# Patient Record
Sex: FEMALE | Race: WHITE
[De-identification: names, ages, dates, MRNs, and addresses within clinical notes are randomized per-mention and may not be internally consistent; named-entity substitution may affect disease eponyms.]

---

## 2017-05-11 ENCOUNTER — HOSPITAL ENCOUNTER (EMERGENCY)
Dept: HOSPITAL 47 - EC | Age: 82
Discharge: HOME | End: 2017-05-11
Payer: MEDICARE

## 2017-05-11 VITALS
DIASTOLIC BLOOD PRESSURE: 69 MMHG | HEART RATE: 82 BPM | RESPIRATION RATE: 14 BRPM | SYSTOLIC BLOOD PRESSURE: 159 MMHG | TEMPERATURE: 99.2 F

## 2017-05-11 DIAGNOSIS — Z90.710: ICD-10-CM

## 2017-05-11 DIAGNOSIS — F17.200: ICD-10-CM

## 2017-05-11 DIAGNOSIS — M81.0: ICD-10-CM

## 2017-05-11 DIAGNOSIS — R50.9: ICD-10-CM

## 2017-05-11 DIAGNOSIS — Z79.899: ICD-10-CM

## 2017-05-11 DIAGNOSIS — Z85.42: ICD-10-CM

## 2017-05-11 DIAGNOSIS — I10: ICD-10-CM

## 2017-05-11 DIAGNOSIS — Z88.6: ICD-10-CM

## 2017-05-11 DIAGNOSIS — N30.90: Primary | ICD-10-CM

## 2017-05-11 LAB
ALP SERPL-CCNC: 96 U/L (ref 38–126)
ALT SERPL-CCNC: 34 U/L (ref 9–52)
ANION GAP SERPL CALC-SCNC: 9 MMOL/L
AST SERPL-CCNC: 30 U/L (ref 14–36)
BASOPHILS # BLD AUTO: 0 K/UL (ref 0–0.2)
BASOPHILS NFR BLD AUTO: 0 %
BUN SERPL-SCNC: 23 MG/DL (ref 7–17)
CALCIUM SPEC-MCNC: 10 MG/DL (ref 8.4–10.2)
CH: 30.2
CHCM: 32.8
CHLORIDE SERPL-SCNC: 102 MMOL/L (ref 98–107)
CO2 SERPL-SCNC: 27 MMOL/L (ref 22–30)
EOSINOPHIL # BLD AUTO: 0.3 K/UL (ref 0–0.7)
EOSINOPHIL NFR BLD AUTO: 2 %
ERYTHROCYTE [DISTWIDTH] IN BLOOD BY AUTOMATED COUNT: 4.09 M/UL (ref 3.8–5.4)
ERYTHROCYTE [DISTWIDTH] IN BLOOD: 13.1 % (ref 11.5–15.5)
GLUCOSE SERPL-MCNC: 126 MG/DL (ref 74–99)
HCT VFR BLD AUTO: 37.8 % (ref 34–46)
HDW: 2.39
HGB BLD-MCNC: 12.1 GM/DL (ref 11.4–16)
LUC NFR BLD AUTO: 2 %
LYMPHOCYTES # SPEC AUTO: 0.7 K/UL (ref 1–4.8)
LYMPHOCYTES NFR SPEC AUTO: 5 %
MCH RBC QN AUTO: 29.7 PG (ref 25–35)
MCHC RBC AUTO-ENTMCNC: 32.1 G/DL (ref 31–37)
MCV RBC AUTO: 92.4 FL (ref 80–100)
MONOCYTES # BLD AUTO: 0.8 K/UL (ref 0–1)
MONOCYTES NFR BLD AUTO: 6 %
NEUTROPHILS # BLD AUTO: 11.9 K/UL (ref 1.3–7.7)
NEUTROPHILS NFR BLD AUTO: 85 %
NON-AFRICAN AMERICAN GFR(MDRD): >60
PARTICLE COUNT: 6881
PH UR: 6 [PH] (ref 5–8)
POTASSIUM SERPL-SCNC: 3.7 MMOL/L (ref 3.5–5.1)
PROT SERPL-MCNC: 6.8 G/DL (ref 6.3–8.2)
PROT UR QL: (no result)
RBC UR QL: >182 /HPF (ref 0–5)
SODIUM SERPL-SCNC: 138 MMOL/L (ref 137–145)
SP GR UR: 1.01 (ref 1–1.03)
SQUAMOUS UR QL AUTO: 1 /HPF (ref 0–4)
UA BILLING (MACRO VS. MICRO): (no result)
UROBILINOGEN UR QL STRIP: <2 MG/DL (ref ?–2)
WBC # BLD AUTO: 0.21 10*3/UL
WBC # BLD AUTO: 14 K/UL (ref 3.8–10.6)
WBC #/AREA URNS HPF: 30 /HPF (ref 0–5)
WBC (PEROX): 14.9

## 2017-05-11 PROCEDURE — 36415 COLL VENOUS BLD VENIPUNCTURE: CPT

## 2017-05-11 PROCEDURE — 85025 COMPLETE CBC W/AUTO DIFF WBC: CPT

## 2017-05-11 PROCEDURE — 83605 ASSAY OF LACTIC ACID: CPT

## 2017-05-11 PROCEDURE — 99283 EMERGENCY DEPT VISIT LOW MDM: CPT

## 2017-05-11 PROCEDURE — 87040 BLOOD CULTURE FOR BACTERIA: CPT

## 2017-05-11 PROCEDURE — 80053 COMPREHEN METABOLIC PANEL: CPT

## 2017-05-11 PROCEDURE — 87086 URINE CULTURE/COLONY COUNT: CPT

## 2017-05-11 PROCEDURE — 81001 URINALYSIS AUTO W/SCOPE: CPT

## 2017-05-11 PROCEDURE — 96365 THER/PROPH/DIAG IV INF INIT: CPT

## 2017-05-11 PROCEDURE — 96361 HYDRATE IV INFUSION ADD-ON: CPT

## 2017-05-11 NOTE — ED
Fever HPI





- General


Chief Complaint: Fever


Stated Complaint: Anxiety


Time Seen by Provider: 05/11/17 21:26


Source: patient


Mode of arrival: wheelchair


Limitations: no limitations





- History of Present Illness


Initial Comments: 





He has a difficulty urinating for the last few days it's painful is more 

frequent and she feels bit of pressure in the pelvic area, she went to the 

family doctor they aren't treating her for cystitis with the Macrobid but 

symptoms are getting worse and today she developed the fever.  Notice that mom 

was shaking and had some breakers she is complaining about some back pain but 

then she has a chronic back pain for years.  Denies any headaches no neck 

stiffness no chest pain or shortness of breath no abdominal pain diarrhea 

system is unremarkable otherwise





- Related Data


 Home Medications











 Medication  Instructions  Recorded  Confirmed


 


Calcium Carbonate [Calcium] 600 mg PO DAILY 05/11/17 05/11/17


 


Cholecalciferol [Vitamin D3] 1,000 unit PO DAILY 05/11/17 05/11/17


 


Lisinopril [Zestril] 10 mg PO DAILY 05/11/17 05/11/17


 


Loratadine [Claritin] 10 mg PO DAILY 05/11/17 05/11/17


 


traMADol HCL [Ultram] 50 mg PO QID PRN 05/11/17 05/11/17








 Previous Rx's











 Medication  Instructions  Recorded


 


Ciprofloxacin HCl [Cipro] 500 mg PO Q12HR #20 tablet 05/11/17











 Allergies











Allergy/AdvReac Type Severity Reaction Status Date / Time


 


aspirin AdvReac  Nausea & Verified 05/11/17 22:02





   Vomiting  














Review of Systems


ROS Statement: 


Those systems with pertinent positive or pertinent negative responses have been 

documented in the HPI.





ROS Other: All systems not noted in ROS Statement are negative.





Past Medical History


Past Medical History: Cancer, Hypertension


Additional Past Medical History / Comment(s): Scoliosis; Osteoporosis; Uterine 

cancer (1970s)


History of Any Multi-Drug Resistant Organisms: None Reported


Past Surgical History: Cholecystectomy, Hysterectomy


Additional Past Surgical History / Comment(s): Lap Nissen


Past Psychological History: Anxiety


Smoking Status: Current every day smoker


Past Alcohol Use History: None Reported


Past Drug Use History: None Reported





General Exam





- General Exam Comments


Initial Comments: 





General:  The patient is awake and alert, in acute distress face is bit flushed 

GCS is 15 no respiratory distress 


Skin:  Skin is warm and dry and no rashes or lesions are noted. 


Eye:  Pupils are equal, round and reactive to light, extra-ocular movements are 

intact; there is normal conjunctiva bilaterally.  


Ears, nose, mouth and throat:  There are moist mucous membranes and no oral 

lesions. 


Neck:  The neck is supple, there is no tenderness  or JVD.  


Cardiovascular:  There is a regular rate and rhythm. No murmur, rub or gallop 

is appreciated.


Respiratory: To auscultation bilateral, decreased breath sounds and crackles at 

the bases


Gastrointestinal:  D tender in suprapubic area and over the one side flank but 

she stated that she has it's back pain for years 


Back:  There is no tenderness to palpation in the midline. There is no obvious 

deformity.


Musculoskeletal:  Normal ROM, no tenderness, There is no pedal edema. There is 

no calf tenderness or swelling. No cords were appreciated.  


Neurological:  CN II-XII intact, Cranial nerves III through XII are intact. 

There are no obvious motor or sensory deficits. Coordination appears grossly 

intact. Speech is normal.


Psychiatric:  Cooperative, appropriate mood & affect, normal judgment.  





Limitations: no limitations





Course


 Vital Signs











  05/11/17





  20:53


 


Temperature 101.2 F H


 


Pulse Rate 95


 


Respiratory 20





Rate 


 


Blood Pressure 215/85


 


O2 Sat by Pulse 95





Oximetry 








He was reassessed at 2315 she was advised to stay inpatient for IV antibiotics 

considering the possibility of pyelonephritis since her urinalysis was positive 

she developed a fever and she has a flank pain also was concerned about her 

blood pressure initial blood pressure was 2:15 systolic and now she feels 

better her blood pressures down to 159 according to the portable blood pressure 

machine bedside and she has a 2 g of Rocephin and some fluids and she has 

excellent support she lives with her daughter in [place] and they said 

the CARE of her if symptoms get worse to bring her back actually I had paged 

Dr. Kimble for the admission but then respecting patient's wishes be sent home 

on Cipro 500 mg twice daily Tylenol as needed and she does have tramadol in her 

possession which she uses for the back pain





Medical Decision Making





- Lab Data


Result diagrams: 


 05/11/17 22:11





 05/11/17 22:11


 Lab Results











  05/11/17 05/11/17 05/11/17 Range/Units





  22:11 22:11 22:11 


 


WBC  14.0 H    (3.8-10.6)  k/uL


 


RBC  4.09    (3.80-5.40)  m/uL


 


Hgb  12.1    (11.4-16.0)  gm/dL


 


Hct  37.8    (34.0-46.0)  %


 


MCV  92.4    (80.0-100.0)  fL


 


MCH  29.7    (25.0-35.0)  pg


 


MCHC  32.1    (31.0-37.0)  g/dL


 


RDW  13.1    (11.5-15.5)  %


 


Plt Count  262    (150-450)  k/uL


 


Neutrophils %  85    %


 


Lymphocytes %  5    %


 


Monocytes %  6    %


 


Eosinophils %  2    %


 


Basophils %  0    %


 


Neutrophils #  11.9 H    (1.3-7.7)  k/uL


 


Lymphocytes #  0.7 L    (1.0-4.8)  k/uL


 


Monocytes #  0.8    (0-1.0)  k/uL


 


Eosinophils #  0.3    (0-0.7)  k/uL


 


Basophils #  0.0    (0-0.2)  k/uL


 


Sodium   138   (137-145)  mmol/L


 


Potassium   3.7   (3.5-5.1)  mmol/L


 


Chloride   102   ()  mmol/L


 


Carbon Dioxide   27   (22-30)  mmol/L


 


Anion Gap   9   mmol/L


 


BUN   23 H   (7-17)  mg/dL


 


Creatinine   0.73   (0.52-1.04)  mg/dL


 


Est GFR (MDRD) Af Amer   >60   (>60 ml/min/1.73 sqM)  


 


Est GFR (MDRD) Non-Af   >60   (>60 ml/min/1.73 sqM)  


 


Glucose   126 H   (74-99)  mg/dL


 


Plasma Lactic Acid Boris    1.0  (0.7-2.0)  mmol/L


 


Calcium   10.0   (8.4-10.2)  mg/dL


 


Total Bilirubin   0.4   (0.2-1.3)  mg/dL


 


AST   30   (14-36)  U/L


 


ALT   34   (9-52)  U/L


 


Alkaline Phosphatase   96   ()  U/L


 


Total Protein   6.8   (6.3-8.2)  g/dL


 


Albumin   3.9   (3.5-5.0)  g/dL














Disposition


Clinical Impression: 


 Fever, Cystitis





Disposition: HOME SELF-CARE


Condition: Fair


Instructions:  Fever in Adults (ED)


Prescriptions: 


Ciprofloxacin HCl [Cipro] 500 mg PO Q12HR #20 tablet


Referrals: 


Shazia Dennison DO [Primary Care Provider] - 1-2 days

## 2017-06-20 ENCOUNTER — HOSPITAL ENCOUNTER (OUTPATIENT)
Dept: HOSPITAL 47 - RADUSWWP | Age: 82
Discharge: HOME | End: 2017-06-20
Payer: MEDICARE

## 2017-06-20 DIAGNOSIS — R93.41: ICD-10-CM

## 2017-06-20 DIAGNOSIS — N13.30: Primary | ICD-10-CM

## 2017-06-20 PROCEDURE — 76770 US EXAM ABDO BACK WALL COMP: CPT

## 2017-06-20 NOTE — US
EXAMINATION TYPE: US kidneys/renal and bladder

 

DATE OF EXAM: 6/20/2017

 

COMPARISON: NONE

 

CLINICAL HISTORY: UTI N39.0, R39.14 Incomplete Bladder Emptying. Frequent UTI's, lower back and pelvi
c pain

 

EXAM MEASUREMENTS:

 

Right Kidney:  11.3 x 4.8 x 4.3 cm

Left Kidney: 9.6 x 4.0 x 3.9 cm

Post Void Residual Volume:  596.1 mL

 

Right Kidney: moderate hydronephrosis extending into calyces with multiple echogenic shadowing foci w
ithin dilated proximal ureter  

Left Kidney: mild hydronephrosis extending into calyces, superior pole limited by overlying bowel gas
  

Bladder: multiple echogenic shadowing foci seen posterior to bladder, possible within bladder vs. wit
hin ureter

**Bilateral Jets seen: right jet not seen

**Normal Post Void Residual: no

 

There is bilateral hydronephrosis, greater on the right left. The posterior wall of the bladder has u
nusual echogenicity. The left ureteral jet is visualized the right is not.

 

IMPRESSION:

 

BILATERAL HYDRONEPHROSIS AND AN ABNORMAL APPEARANCE OF THE POSTERIOR WALL OF THE BLADDER WITH ABSENCE
 OF ONE OF THE URETERAL JETS. A CT SCAN OF THE ABDOMEN AND PELVIS COULD BE SUGGESTED.

## 2019-01-25 ENCOUNTER — HOSPITAL ENCOUNTER (EMERGENCY)
Dept: HOSPITAL 47 - EC | Age: 84
LOS: 1 days | Discharge: HOME | End: 2019-01-26
Payer: MEDICARE

## 2019-01-25 DIAGNOSIS — Z88.6: ICD-10-CM

## 2019-01-25 DIAGNOSIS — N39.0: ICD-10-CM

## 2019-01-25 DIAGNOSIS — Z85.42: ICD-10-CM

## 2019-01-25 DIAGNOSIS — M48.56XA: Primary | ICD-10-CM

## 2019-01-25 DIAGNOSIS — Z79.899: ICD-10-CM

## 2019-01-25 DIAGNOSIS — F17.200: ICD-10-CM

## 2019-01-25 DIAGNOSIS — I10: ICD-10-CM

## 2019-01-25 DIAGNOSIS — N20.0: ICD-10-CM

## 2019-01-25 PROCEDURE — 81001 URINALYSIS AUTO W/SCOPE: CPT

## 2019-01-25 PROCEDURE — 87086 URINE CULTURE/COLONY COUNT: CPT

## 2019-01-25 PROCEDURE — 72192 CT PELVIS W/O DYE: CPT

## 2019-01-25 PROCEDURE — 99284 EMERGENCY DEPT VISIT MOD MDM: CPT

## 2019-01-25 PROCEDURE — 72131 CT LUMBAR SPINE W/O DYE: CPT

## 2019-01-25 PROCEDURE — 96372 THER/PROPH/DIAG INJ SC/IM: CPT

## 2019-01-25 PROCEDURE — 96374 THER/PROPH/DIAG INJ IV PUSH: CPT

## 2019-01-25 NOTE — CT
EXAMINATION TYPE: CT pelvis wo con

 

DATE OF EXAM: 1/25/2019

 

COMPARISON: None

 

HISTORY: Hx. of osteoporosis and scoliosis; pt. c/o back pain

 

CT DLP: 276.8 mGycm

Automated exposure control for dose reduction was used.

 

FINDINGS: 

Multiple axial sections were obtained from the top of iliac crests to the subtrochanteric femurs with
 no contrast.

THERE ARE NUMEROUS LARGE CALCIFICATIONS IN THE DEPENDENT DILATED RIGHT RENAL PELVIS. I SEE NO SIGNIFI
CANT RENAL ATROPHY. ABDOMINAL AORTA IS ATHEROMATOUS. THE SACRUM IS INTACT. THE SACROILIAC JOINTS ARE 
INTACT. THE PELVIC RING IS INTACT. THERE IS NARROWING OF HIP JOINT SPACES. THE PROXIMAL FEMURS ARE IN
TACT WITHOUT EVIDENCE OF A FRACTURE. THERE ARE MODERATE SPONDYLOTIC CHANGES IN THE LOWER LUMBAR SPINE
. THERE IS LARGE URINARY BLADDER WITH CALCIFICATIONS IN THE DEPENDENT BLADDER. URINARY BLADDER MEASUR
ES 12 CM. SACRUM AND COCCYX SHOW NO FRACTURE. THERE IS EXTENSIVE ATHEROSCLEROTIC VASCULAR CALCIFICATI
ON.

 

IMPRESSION:

Spondylotic changes in the lumbar spine. No compression fracture. No evidence of pelvic fracture. Mil
d osteoarthritis in the hip joints. Bladder calculi. Right renal calculi.

## 2019-01-25 NOTE — CT
EXAMINATION TYPE: CT lumbar spine wo con

 

DATE OF EXAM: 1/25/2019 11:48 PM

 

COMPARISON: None

 

HISTORY: Hx. of osteoporosis and scoliosis; pt. c/o back pain

 

CT DLP: 908.6 mGycm

Automated exposure control for dose reduction was used.

 

Unenhanced CT of the lumbar spine was performed.  Bone and soft tissue window settings are submitted 
as well as coronal and sagittal reconstructions. 

 

There is moderate degenerative disc space narrowing in the lumbar spine with spur formation. There is
 5% wedging of L4 vertebra. There is similar 15% wedging of L1 vertebra. L1 fracture could be acute.

 

The posterior elements appear intact. Sacroiliac joints are intact. There is extensive vascular calci
fication. There is no evidence of scoliosis.

 

IMPRESSION:

There is mild compression fracture of L1 that is probably an acute fracture.

 

Multilevel spondylotic changes.

## 2019-01-26 VITALS
SYSTOLIC BLOOD PRESSURE: 168 MMHG | RESPIRATION RATE: 16 BRPM | HEART RATE: 80 BPM | TEMPERATURE: 98.1 F | DIASTOLIC BLOOD PRESSURE: 78 MMHG

## 2019-01-26 LAB
PH UR: 6.5 [PH] (ref 5–8)
PROT UR QL: (no result)
RBC UR QL: 164 /HPF (ref 0–5)
SP GR UR: 1.01 (ref 1–1.03)
SQUAMOUS UR QL AUTO: <1 /HPF (ref 0–4)
UROBILINOGEN UR QL STRIP: <2 MG/DL (ref ?–2)
WBC #/AREA URNS HPF: 33 /HPF (ref 0–5)

## 2019-01-26 NOTE — ED
Back Pain HPI





- General


Chief Complaint: Back Pain/Injury


Stated Complaint: back pain


Source: patient, EMS


Limitations: no limitations





- History of Present Illness


Initial Comments: 


89-year-old female past medical history of osteoporosis and chronic back pain 

presented today for chief complaint of low back pain.  Patient states that for 

the past few year she has had chronic low back pain, she states it was recently 

agitated after getting on a table for an x-ray notably in the lower aspect of 

the spine.  She states that it is a constant aching pain that becomes sharp 

with movement.  Patient states she feels the pain radiate towards the hips 

bilaterally.  Patient states that since has been painful to walk.  Patient 

denies any numbness, tingling, weakness with this loss of sensation, urinary 

retention or loss of bowel bladder control.  Patient denies any falls within 

normal the last year.  Patient states her last fall was about a year ago.  

Patient denies any fever, chills.  Patient does have history of uterine cancer.

  Remainder of ROS negative, Patient denies any recent fever, chills, shortness 

of breath, chest pain, flank pain, abdominal pain, nausea or vomiting, numbness 

or tingling, dysuria or hematuria, constipation or diarrhea, headaches or 

visual changes, or any other complaints.  Patient states that she is feeling 

her typical lower back pain that has been ongoing for the past 2 months, 

however appear to be increased this evening and patient can no longer take the 

pain.  Patient states that her tramadol is not working for pain management.  

Upon arrival patient appears uncomfortable however in no acute distress.  

Patient appears nontoxic.








- Related Data


 Home Medications











 Medication  Instructions  Recorded  Confirmed


 


Calcium Carbonate [Calcium] 600 mg PO DAILY 05/11/17 01/25/19


 


Cholecalciferol [Vitamin D3] 1,000 unit PO DAILY 05/11/17 01/25/19


 


Lisinopril [Zestril] 10 mg PO DAILY 05/11/17 01/25/19


 


Loratadine [Claritin] 10 mg PO DAILY 05/11/17 01/25/19


 


traMADol HCL [Ultram] 50 mg PO QID PRN 05/11/17 01/25/19


 


Gabapentin [Neurontin] 1 - 3 cap PO HS 01/25/19 01/25/19


 


Multivitamins, Thera [Multivitamin 1 tab PO DAILY 01/25/19 01/25/19





(formulary)]   


 


Timolol 0.5% Ophth Soln [Timoptic 1 drop LEFT EYE DAILY 01/25/19 01/25/19





0.5% Ophth Soln]   


 


Vit C/E/Zn/Coppr/Lutein/Zeaxan 1 cap PO BID 01/25/19 01/25/19





[Preservision Areds 2 Softgel]   








 Previous Rx's











 Medication  Instructions  Recorded


 


HYDROcodone/APAP 5-325MG [Norco 5] 1 each PO Q6HR PRN 3 Days #12 tab 01/26/19


 


Sulfamethox-Tmp 800-160Mg [Bactrim 1 tab PO Q12HR 7 Days #14 tab 01/26/19





-160 mg]  











 Allergies











Allergy/AdvReac Type Severity Reaction Status Date / Time


 


aspirin AdvReac  Nausea & Verified 01/25/19 23:18





   Vomiting  














Review of Systems


ROS Statement: 


Those systems with pertinent positive or pertinent negative responses have been 

documented in the HPI.





ROS Other: All systems not noted in ROS Statement are negative.





Past Medical History


Past Medical History: Cancer, Hypertension


Additional Past Medical History / Comment(s): Scoliosis; Osteoporosis; Uterine 

cancer (1970s)


History of Any Multi-Drug Resistant Organisms: None Reported


Past Surgical History: Cholecystectomy, Hysterectomy


Additional Past Surgical History / Comment(s): Lap Nissen


Past Psychological History: Anxiety


Smoking Status: Current every day smoker


Past Alcohol Use History: None Reported


Past Drug Use History: None Reported





General Exam





- General Exam Comments


Initial Comments: 


General:  The patient is awake and alert, in no distress, and does not appear 

acutely ill. 


Eye:  Pupils are equal, round and reactive to light, extra-ocular movements are 

intact.  No nystagmus.  There is normal conjunctiva bilaterally.  No signs of 

icterus.  


Ears, nose, mouth and throat:  There are moist mucous membranes and no oral 

lesions. 


Neck:  The neck is supple, there is no tenderness or JVD.  


Cardiovascular:  There is a regular rate and rhythm. No murmur, rub or gallop 

is appreciated.


Respiratory:  Lungs are clear to auscultation, respirations are non-labored, 

breath sounds are equal.  No wheezes, stridor, rales, or rhonchi.


Gastrointestinal:  Soft, non-distended, non-tender abdomen without masses or 

organomegaly noted. There is no rebound or guarding present. Bowel sounds are 

unremarkable.


Musculoskeletal: Normal inspection of the cervical thoracic and lumbar spine.  

Patient is tender both paravertebral and midline of the lumbar spine.  Normal 

ROM of the lower extremities equal bilaterally, no tenderness.  No pending 

tenderness to the palpation of the hips.  Strength 5/5 of the upper and lower 

extremities equal bilaterally. Sensation intact of the lower extremities equal 

bilaterally including the saddle region. DP pulses equal bilaterally 2+. 

Capillary refill <2 seconds.


Neurological:  A&O x 3. CN II-XII intact, There are no obvious motor or sensory 

deficits. Coordination appears grossly intact. Speech is normal.


Skin:  Skin is warm and dry and no rashes or lesions are noted. 


Psychiatric:  Cooperative, appropriate mood & affect, normal judgment.  





Limitations: no limitations





Course


 Vital Signs











  01/25/19 01/25/19 01/25/19





  22:30 22:55 23:54


 


Temperature 97.9 F  


 


Pulse Rate 114 H  92


 


Respiratory 18  18





Rate   


 


Blood Pressure 217/117 200/88 191/82


 


O2 Sat by Pulse 97  100





Oximetry   














  01/26/19 01/26/19





  00:17 01:42


 


Temperature  98.1 F


 


Pulse Rate 89 80


 


Respiratory 18 16





Rate  


 


Blood Pressure 163/83 168/78


 


O2 Sat by Pulse 94 L 95





Oximetry  














Medical Decision Making





- Medical Decision Making


CT revealed a compression fracture of L1, this appears acute.  This seems 

consistent patient's symptoms and PE findings-renal calculi also noted, however 

pt symptoms do not appear consistent with renal colic.  Patient neurovascular 

intact.  No signs Of cauda equina.  Patient afebrile appearing well.  Patient's 

family states she seems to use the bathroom unusual and were concerned about 

urinary tract infection.  Patient denies any symptoms.  Urine concerning for 

infection, culture pending.  Patient started on Bactrim twice a day 7 days.  

Case discussed the timing provider Dr. Ricardo at this time we do feel patient is 

stable for discharge with orthopedic surgery follow-up on Monday.  Patient is 

agreeable plan discharge.  Return parameters were discussed at length with both 

family and patient.  Patient's power of  is her daughter was requesting 

medication for better pain management.  Patient was given prescription for Norco

, 3 day supply, did instruct patient to discontinue use of tramadol.  Patient 

and family are unable with the medication regimen for pain control.  Patient 

discharged stable condition appearing well.








- Lab Data


 Lab Results











  01/26/19 Range/Units





  00:28 


 


Urine Color  Light Yellow  


 


Urine Appearance  Cloudy H  (Clear)  


 


Urine pH  6.5  (5.0-8.0)  


 


Ur Specific Gravity  1.006  (1.001-1.035)  


 


Urine Protein  2+ H  (Negative)  


 


Urine Glucose (UA)  Negative  (Negative)  


 


Urine Ketones  Negative  (Negative)  


 


Urine Blood  Moderate H  (Negative)  


 


Urine Nitrite  Negative  (Negative)  


 


Urine Bilirubin  Negative  (Negative)  


 


Urine Urobilinogen  <2.0  (<2.0)  mg/dL


 


Ur Leukocyte Esterase  Large H  (Negative)  


 


Urine RBC  164 H  (0-5)  /hpf


 


Urine WBC  33 H  (0-5)  /hpf


 


Urine WBC Clumps  Few H  (None)  /hpf


 


Ur Squamous Epith Cells  <1  (0-4)  /hpf


 


Urine Bacteria  Rare H  (None)  /hpf














Disposition


Clinical Impression: 


 Compression fracture of L1 lumbar vertebra, UTI (urinary tract infection)





Disposition: HOME SELF-CARE


Condition: Good


Instructions (If sedation given, give patient instructions):  Vertebral 

Compression Fracture (ED)


Additional Instructions: 


Please use medication as discussed. Please discontinue Tramadol as discussed. 

Please follow-up with family doctor in the next 2 days, please follow-up with 

orthopedic surgery in the next 3-4 days.  Please return to emergency room if 

the symptoms increase or worsen or for any other concerns.


Prescriptions: 


HYDROcodone/APAP 5-325MG [Norco 5] 1 each PO Q6HR PRN 3 Days #12 tab


 PRN Reason: Severe Pain


Sulfamethox-Tmp 800-160Mg [Bactrim -160 mg] 1 tab PO Q12HR 7 Days #14 tab


Is patient prescribed a controlled substance at d/c from ED?: Yes


When asked, does pt state using other controlled substances?: Yes


If prescribed controlled substance>3 days was MAPS reviewed?: Prescribed <3 Days


If opioid is for acute pain is fill amount 7 days or less?: Yes


If Rx opioid, was Start Talking consent form obtained?: Yes


Referrals: 


Shazia Dennison DO [Primary Care Provider] - 1-2 days


Garrett Orellana DO [Medical Doctor] - 1-2 days


Time of Disposition: 00:20

## 2019-02-09 ENCOUNTER — HOSPITAL ENCOUNTER (OUTPATIENT)
Dept: HOSPITAL 47 - EC | Age: 84
Setting detail: OBSERVATION
LOS: 2 days | Discharge: HOME | End: 2019-02-11
Payer: MEDICARE

## 2019-02-09 DIAGNOSIS — R26.9: ICD-10-CM

## 2019-02-09 DIAGNOSIS — Z91.81: ICD-10-CM

## 2019-02-09 DIAGNOSIS — Z79.891: ICD-10-CM

## 2019-02-09 DIAGNOSIS — M51.16: ICD-10-CM

## 2019-02-09 DIAGNOSIS — M47.9: ICD-10-CM

## 2019-02-09 DIAGNOSIS — F17.200: ICD-10-CM

## 2019-02-09 DIAGNOSIS — Z90.710: ICD-10-CM

## 2019-02-09 DIAGNOSIS — F41.9: ICD-10-CM

## 2019-02-09 DIAGNOSIS — M41.9: ICD-10-CM

## 2019-02-09 DIAGNOSIS — Z85.42: ICD-10-CM

## 2019-02-09 DIAGNOSIS — R53.81: ICD-10-CM

## 2019-02-09 DIAGNOSIS — R29.6: ICD-10-CM

## 2019-02-09 DIAGNOSIS — S32.019A: Primary | ICD-10-CM

## 2019-02-09 DIAGNOSIS — Z88.6: ICD-10-CM

## 2019-02-09 DIAGNOSIS — I10: ICD-10-CM

## 2019-02-09 DIAGNOSIS — M81.0: ICD-10-CM

## 2019-02-09 DIAGNOSIS — M48.061: ICD-10-CM

## 2019-02-09 DIAGNOSIS — Z79.899: ICD-10-CM

## 2019-02-09 PROCEDURE — 96376 TX/PRO/DX INJ SAME DRUG ADON: CPT

## 2019-02-09 PROCEDURE — 85025 COMPLETE CBC W/AUTO DIFF WBC: CPT

## 2019-02-09 PROCEDURE — 96375 TX/PRO/DX INJ NEW DRUG ADDON: CPT

## 2019-02-09 PROCEDURE — 80053 COMPREHEN METABOLIC PANEL: CPT

## 2019-02-09 PROCEDURE — 96374 THER/PROPH/DIAG INJ IV PUSH: CPT

## 2019-02-09 PROCEDURE — 99285 EMERGENCY DEPT VISIT HI MDM: CPT

## 2019-02-09 PROCEDURE — 96372 THER/PROPH/DIAG INJ SC/IM: CPT

## 2019-02-09 RX ADMIN — ENOXAPARIN SODIUM SCH MG: 40 INJECTION SUBCUTANEOUS at 15:46

## 2019-02-09 RX ADMIN — HYDROCODONE BITARTRATE AND ACETAMINOPHEN PRN EACH: 5; 325 TABLET ORAL at 13:00

## 2019-02-09 RX ADMIN — HYDROMORPHONE HYDROCHLORIDE PRN MG: 1 INJECTION, SOLUTION INTRAMUSCULAR; INTRAVENOUS; SUBCUTANEOUS at 21:39

## 2019-02-09 RX ADMIN — TIMOLOL MALEATE SCH DROPS: 5 SOLUTION OPHTHALMIC at 20:15

## 2019-02-09 RX ADMIN — Medication SCH UNIT: at 13:00

## 2019-02-09 RX ADMIN — THERA TABS SCH EACH: TAB at 13:00

## 2019-02-09 RX ADMIN — CALCIUM CARBONATE (ANTACID) CHEW TAB 500 MG SCH: 500 CHEW TAB at 13:09

## 2019-02-09 RX ADMIN — METHYLPREDNISOLONE SODIUM SUCCINATE SCH MG: 125 INJECTION, POWDER, FOR SOLUTION INTRAMUSCULAR; INTRAVENOUS at 13:01

## 2019-02-09 RX ADMIN — FAMOTIDINE SCH MG: 20 TABLET, FILM COATED ORAL at 13:01

## 2019-02-09 RX ADMIN — CEFAZOLIN SCH MLS/HR: 330 INJECTION, POWDER, FOR SOLUTION INTRAMUSCULAR; INTRAVENOUS at 15:46

## 2019-02-09 RX ADMIN — HYDROCODONE BITARTRATE AND ACETAMINOPHEN PRN EACH: 5; 325 TABLET ORAL at 19:09

## 2019-02-09 RX ADMIN — CALCIUM CARBONATE (ANTACID) CHEW TAB 500 MG SCH MG: 500 CHEW TAB at 13:00

## 2019-02-09 RX ADMIN — LISINOPRIL SCH MG: 10 TABLET ORAL at 13:00

## 2019-02-09 RX ADMIN — FAMOTIDINE SCH MG: 20 TABLET, FILM COATED ORAL at 20:15

## 2019-02-09 NOTE — P.CNOR
History of Present Illness





- [facility]


Consult date: 02/09/19


Consult reason: low back pain (L1 compression fracture.)


History of present illness: 





This is an 89-year-old female who is admitted with intractable back pain.  She 

states that she does have history of a fall a couple of months ago.  She states 

her back pain did not become severe until a week or 2 ago.  She states that she 

is not aware of a recent fall.  She is somewhat of a poor historian.  She was 

seen in the emergency department about a week ago.  A computed tomography scan 

was done which showed a mild L1 compression deformity.  She returned to the ER 

yesterday with increasing low back pain.  She was supposed see Dr. Marie in our 

office for an appointment but states that she was unable to take the pain any 

longer.





Past Medical History


Past Medical History: Cancer, Hypertension


Additional Past Medical History / Comment(s): Scoliosis; Osteoporosis; Uterine 

cancer (1970s)


History of Any Multi-Drug Resistant Organisms: None Reported


Past Surgical History: Cholecystectomy, Hysterectomy


Additional Past Surgical History / Comment(s): Lap Nissen


Past Psychological History: Anxiety


Smoking Status: Former smoker


Past Alcohol Use History: None Reported


Past Drug Use History: None Reported





Medications and Allergies


 Home Medications











 Medication  Instructions  Recorded  Confirmed  Type


 


Calcium Carbonate [Calcium] 600 mg PO DAILY 05/11/17 02/09/19 History


 


Cholecalciferol [Vitamin D3] 1,000 unit PO DAILY 05/11/17 02/09/19 History


 


Lisinopril [Zestril] 10 mg PO DAILY 05/11/17 02/09/19 History


 


traMADol HCL [Ultram] 50 mg PO QID 05/11/17 02/09/19 History


 


Multivitamins, Thera [Multivitamin 1 tab PO DAILY 01/25/19 02/09/19 History





(formulary)]    


 


Timolol 0.5% Ophth Soln [Timoptic 1 drop LEFT EYE DAILY 01/25/19 02/09/19 

History





0.5% Ophth Soln]    


 


Vit C/E/Zn/Coppr/Lutein/Zeaxan 1 cap PO BID 01/25/19 02/09/19 History





[Preservision Areds 2 Softgel]    


 


ALPRAZolam [Xanax] 0.25 mg PO BID PRN 02/09/19 02/09/19 History


 


oxyCODONE-APAP 7.5-325MG [Percocet 1 tab PO Q8H PRN 02/09/19 02/09/19 History





7.5-325 mg]    











 Allergies











Allergy/AdvReac Type Severity Reaction Status Date / Time


 


aspirin AdvReac  Nausea & Verified 02/09/19 10:57





   Vomiting  














Physical Examination


This is a pleasant 89-year-old female in no acute distress.  She is alert and 

oriented to person and place.  She has some confusion as far as recent events.  

Exam of the head neck reveal no obvious deformity.  She has full cervical spine 

motion without difficulty or pain.  No pain with palpation about cervical spine 

or paraspinal musculature.


Exam of the thoracic and lumbar spine reveal no obvious deformity.  There is 

pain with palpation about the upper lumbar and lower thoracic spine.  Increased 

pain with percussion of the upper lumbar spine.


Exam of the lower extremities reveals no obvious deformity.  She is able to 

lift each leg off the bed independently.  She has no pain with logroll 

bilaterally.  She has full foot and ankle motion without difficulty or pain.  

Neurovascular status to the lower extremities is intact.








Results


X-ray and CT of the lumbar spine taken one week ago reveal degenerative 

arthritis and disc disease throughout.  There is a mild anterior compression of 

L1.  No displacement or retropulsion noted.  No other fractures identified.





Assessment and Plan


(1) Compression fracture of L1 lumbar vertebra


Current Visit: No   Status: Acute   Code(s): S32.010A - WEDGE COMPRESSION 

FRACTURE OF FIRST LUMBAR VERTEBRA, INIT   SNOMED Code(s): 667198830


   


Plan: 





The clinical and x-ray findings are discussed with the patient.  Family was 

present but left during the examination.  The case was discussed with Dr. Marie.  I will order an LSO brace for immobilization and comfort.  She may be 

up with the brace on.  We will continue to follow.

## 2019-02-09 NOTE — ED
Back Pain [facility]





- General


Chief Complaint: Back Pain/Injury


Stated Complaint: back pain


Time Seen by Provider: 02/09/19 04:36


Source: patient


Limitations: physical limitation





- History of Present Illness


Initial Comments: 





This patient is an 89-year-old woman who presents to be evaluated for severe 

low back pain.  The patient states that she had a fall over 2 months ago.  She 

did have some pain there but it was doing okay for a while and then over the 

past couple of weeks has had worsening of the pain.  She was seen here at the 

end of January, and told that a CAT scan showed a compression fracture.  The 

patient has been taking prescription analgesics without much relief.  She 

states that tonight she has not been able to sleep.  She feels that when she 

lies in bed makes her back pain worse.  She has not found anything that 

relieves the pain.  Patient denies weakness or numbness of the extremities.  

With some movements there is radiation of the pain to the extremities.  Patient 

denies loss of bladder or bowel control.


MD Complaint: back pain, back injury


-: week(s)


Similar Symptoms Previously: Yes


Place: home


Radiation: left leg, right leg


Severity: severe


Quality: sharp


Consistency: constant


Improves With: none


Worsens With: none


Context: bending


Associated Symptoms: denies other symptoms





- Related Data


 Home Medications











 Medication  Instructions  Recorded  Confirmed


 


Calcium Carbonate [Calcium] 600 mg PO DAILY 05/11/17 02/09/19


 


Cholecalciferol [Vitamin D3] 1,000 unit PO DAILY 05/11/17 02/09/19


 


Lisinopril [Zestril] 10 mg PO DAILY 05/11/17 02/09/19


 


traMADol HCL [Ultram] 50 mg PO QID 05/11/17 02/09/19


 


Multivitamins, Thera [Multivitamin 1 tab PO DAILY 01/25/19 02/09/19





(formulary)]   


 


Timolol 0.5% Ophth Soln [Timoptic 1 drop LEFT EYE DAILY 01/25/19 02/09/19





0.5% Ophth Soln]   


 


Vit C/E/Zn/Coppr/Lutein/Zeaxan 1 cap PO BID 01/25/19 02/09/19





[Preservision Areds 2 Softgel]   


 


ALPRAZolam [Xanax] 0.25 mg PO BID PRN 02/09/19 02/09/19


 


oxyCODONE-APAP 7.5-325MG [Percocet 1 tab PO Q8H PRN 02/09/19 02/09/19





7.5-325 mg]   











 Allergies











Allergy/AdvReac Type Severity Reaction Status Date / Time


 


aspirin AdvReac  Nausea & Verified 02/09/19 10:57





   Vomiting  














Review of Systems


ROS Statement: 


Those systems with pertinent positive or pertinent negative responses have been 

documented in the HPI.





ROS Other: All systems not noted in ROS Statement are negative.


Constitutional: Denies: fever, chills, weakness


Respiratory: Denies: cough, dyspnea


Cardiovascular: Denies: chest pain, edema


Gastrointestinal: Denies: abdominal pain, vomiting, diarrhea, constipation


Genitourinary: Denies: dysuria, hematuria


Musculoskeletal: Reports: as per HPI, back pain


Skin: Denies: rash


Neurological: Denies: headache, weakness, numbness





Past Medical History


Past Medical History: Cancer, Hypertension


Additional Past Medical History / Comment(s): Scoliosis; Osteoporosis; Uterine 

cancer (1970s)


History of Any Multi-Drug Resistant Organisms: None Reported


Past Surgical History: Cholecystectomy, Hysterectomy


Additional Past Surgical History / Comment(s): Lap Nissen


Past Psychological History: Anxiety


Smoking Status: Current every day smoker


Past Alcohol Use History: None Reported


Past Drug Use History: None Reported





General Exam


Limitations: physical limitation


General appearance: alert, in no apparent distress, cachectic


Head exam: Present: atraumatic, normocephalic


Respiratory exam: Present: normal lung sounds bilaterally.  Absent: respiratory 

distress, wheezes, rales, rhonchi, stridor


Cardiovascular Exam: Present: regular rate, normal rhythm, systolic murmur.  

Absent: diastolic murmur, rubs, gallop


GI/Abdominal exam: Present: soft.  Absent: tenderness, guarding, rebound, mass


Extremities exam: Present: normal inspection, normal capillary refill.  Absent: 

pedal edema, calf tenderness


Back exam: Present: vertebral tenderness (approx L1 level).  Absent: CVA 

tenderness (R), CVA tenderness (L)


Neurological exam: Present: alert


Skin exam: Present: warm, dry, intact, normal color.  Absent: rash





Course


 Vital Signs











  02/09/19 02/09/19 02/09/19





  04:26 05:49 06:30


 


Temperature 97.7 F  


 


Pulse Rate 70 70 69


 


Pulse Rate [   





Apical]   


 


Respiratory 20 16 16





Rate   


 


Blood Pressure 146/86 130/59 130/59


 


Blood Pressure   





[Left Arm]   


 


O2 Sat by Pulse 95 96 96





Oximetry   














  02/09/19 02/09/19





  09:01 09:15


 


Temperature 97.7 F 98.2 F


 


Pulse Rate  75


 


Pulse Rate [ 72 





Apical]  


 


Respiratory 16 16





Rate  


 


Blood Pressure  120/66


 


Blood Pressure 140/64 





[Left Arm]  


 


O2 Sat by Pulse  96





Oximetry  














Medical Decision Making





- Medical Decision Making





Patient is an 89-year-old woman presenting with intractable back pain.  We did 

try multiple rounds of medication but the patient was not able to get up and 

ambulate.  Case discussed with Dr. Almanza, covering for her primary physician.  

He will admit with consultation to Dr. Marie








Disposition


Clinical Impression: 


 Compression fracture of L1 lumbar vertebra, Intractable low back pain





Disposition: ADMITTED AS IP TO THIS [facility]


Condition: Poor

## 2019-02-09 NOTE — P.HPIM
History of Present Illness


H&P Date: 02/09/19





Ramona Stark is an 89-year-old female who presented to [facility] emergency room due to intractable back pain and physical debility with 

gait disturbance.  She was evaluated in emergency room lumbar spine x-ray 

revealed evidence of a wedge fracture of L1 vertebrae.  Patient was having 

difficulty standing up or walking, she was admitted to medical floor, 

neurosurgical consultation was requested.


Patient stated that she had a fall about 1 month ago, she was having some mild 

back pain however she started having more severe pain about 1 week ago, she 

denies having any more recent falls.  Patient developed difficulty getting up 

standing and walking due to severe pain.  She was evaluated in emergency room 

and was started on pain medication and was admitted to medical floor.





Past Medical History


Past Medical History: Cancer, Hypertension


Additional Past Medical History / Comment(s): Scoliosis; Osteoporosis; Uterine 

cancer (1970s)


History of Any Multi-Drug Resistant Organisms: None Reported


Past Surgical History: Cholecystectomy, Hysterectomy


Additional Past Surgical History / Comment(s): Lap Nissen


Past Psychological History: Anxiety


Smoking Status: Former smoker


Past Alcohol Use History: None Reported


Past Drug Use History: None Reported





Medications and Allergies


 Home Medications











 Medication  Instructions  Recorded  Confirmed  Type


 


Calcium Carbonate [Calcium] 600 mg PO DAILY 05/11/17 02/09/19 History


 


Cholecalciferol [Vitamin D3] 1,000 unit PO DAILY 05/11/17 02/09/19 History


 


Lisinopril [Zestril] 10 mg PO DAILY 05/11/17 02/09/19 History


 


traMADol HCL [Ultram] 50 mg PO QID 05/11/17 02/09/19 History


 


Multivitamins, Thera [Multivitamin 1 tab PO DAILY 01/25/19 02/09/19 History





(formulary)]    


 


Timolol 0.5% Ophth Soln [Timoptic 1 drop LEFT EYE DAILY 01/25/19 02/09/19 

History





0.5% Ophth Soln]    


 


Vit C/E/Zn/Coppr/Lutein/Zeaxan 1 cap PO BID 01/25/19 02/09/19 History





[Preservision Areds 2 Softgel]    


 


ALPRAZolam [Xanax] 0.25 mg PO BID PRN 02/09/19 02/09/19 History


 


oxyCODONE-APAP 7.5-325MG [Percocet 1 tab PO Q8H PRN 02/09/19 02/09/19 History





7.5-325 mg]    











 Allergies











Allergy/AdvReac Type Severity Reaction Status Date / Time


 


aspirin AdvReac  Nausea & Verified 02/09/19 10:57





   Vomiting  














Physical Exam


Vitals: 


 Vital Signs











  Temp Pulse Pulse Resp BP BP Pulse Ox


 


 02/09/19 09:15  98.2 F  75   16  120/66   96


 


 02/09/19 09:01  97.7 F   72  16   140/64 


 


 02/09/19 06:30   69   16  130/59   96


 


 02/09/19 05:49   70   16  130/59   96


 


 02/09/19 04:26  97.7 F  70   20  146/86   95








 Intake and Output











 02/08/19 02/09/19 02/09/19





 22:59 06:59 14:59


 


Other:   


 


  Weight  49.033 kg 














In general patient is alert and oriented 3 in no apparent distress


HEENT head normocephalic and atraumatic


Neck is supple no JVD no goiter no lymphadenopathy


Chest exam reveals a few scattered crackles no wheezing


Cardiac exam reveals regular heart sounds S1 and S2 no gallops no murmurs


Abdomen is soft nontender no organomegaly with normal bowel sounds


Extremity exam reveals no edema no cyanosis or clubbing


Neurological examination reveals no gross focal deficit





Thrombosis Risk Factor Assmnt





- Choose All That Apply


Any of the Below Risk Factors Present?: Yes


Each Factor Represents 1 point: Medical pt on bed rest


Other Risk Factors: Yes


Each Risk Factor Represents 3 Points: Age 75 years or older


Other congenital or acquired thrombophilia - If yes, enter type in comment: No


Thrombosis Risk Factor Assessment Total Risk Factor Score: 4


Thrombosis Risk Factor Assessment Level: Moderate Risk





Assessment and Plan


Plan: 





#1 L1 wedge vertebral fracture


#2 intractable back pain


#3 physical debility with difficulty standing up and walking


#4 underlying history of hypertension well-controlled on lisinopril


#5 underlying history of anxiety disorder





At this time patient is admitted to medical floor


Neurosurgery consultation was requested patient will be fitted for LSO brace


Pain management with oral tramadol and oral Norco as needed


Physical therapy consultation was requested for evaluation and treatment 

patient may need to be transferred to a rehab unit after this acute admission


For DVT prophylaxis subcu Lovenox for GI prophylaxis oral Pepcid


Will follow closely

## 2019-02-10 RX ADMIN — ENOXAPARIN SODIUM SCH MG: 40 INJECTION SUBCUTANEOUS at 08:07

## 2019-02-10 RX ADMIN — FAMOTIDINE SCH MG: 20 TABLET, FILM COATED ORAL at 08:08

## 2019-02-10 RX ADMIN — METHYLPREDNISOLONE SODIUM SUCCINATE SCH MG: 125 INJECTION, POWDER, FOR SOLUTION INTRAMUSCULAR; INTRAVENOUS at 08:09

## 2019-02-10 RX ADMIN — HYDROMORPHONE HYDROCHLORIDE PRN MG: 1 INJECTION, SOLUTION INTRAMUSCULAR; INTRAVENOUS; SUBCUTANEOUS at 08:08

## 2019-02-10 RX ADMIN — HYDROCODONE BITARTRATE AND ACETAMINOPHEN PRN EACH: 5; 325 TABLET ORAL at 10:48

## 2019-02-10 RX ADMIN — HYDROMORPHONE HYDROCHLORIDE PRN MG: 1 INJECTION, SOLUTION INTRAMUSCULAR; INTRAVENOUS; SUBCUTANEOUS at 19:56

## 2019-02-10 RX ADMIN — CEFAZOLIN SCH: 330 INJECTION, POWDER, FOR SOLUTION INTRAMUSCULAR; INTRAVENOUS at 17:43

## 2019-02-10 RX ADMIN — HYDROCODONE BITARTRATE AND ACETAMINOPHEN PRN EACH: 5; 325 TABLET ORAL at 23:09

## 2019-02-10 RX ADMIN — LISINOPRIL SCH MG: 10 TABLET ORAL at 08:18

## 2019-02-10 RX ADMIN — HYDROMORPHONE HYDROCHLORIDE PRN MG: 1 INJECTION, SOLUTION INTRAMUSCULAR; INTRAVENOUS; SUBCUTANEOUS at 04:55

## 2019-02-10 RX ADMIN — LISINOPRIL SCH MG: 10 TABLET ORAL at 09:00

## 2019-02-10 RX ADMIN — THERA TABS SCH EACH: TAB at 08:08

## 2019-02-10 RX ADMIN — FAMOTIDINE SCH MG: 20 TABLET, FILM COATED ORAL at 20:09

## 2019-02-10 RX ADMIN — CALCIUM CARBONATE (ANTACID) CHEW TAB 500 MG SCH: 500 CHEW TAB at 08:08

## 2019-02-10 RX ADMIN — Medication SCH UNIT: at 08:08

## 2019-02-10 RX ADMIN — TIMOLOL MALEATE SCH DROPS: 5 SOLUTION OPHTHALMIC at 17:07

## 2019-02-10 RX ADMIN — HYDROCODONE BITARTRATE AND ACETAMINOPHEN PRN EACH: 5; 325 TABLET ORAL at 16:48

## 2019-02-10 NOTE — P.PN
Subjective


Progress Note Date: 02/10/19





Ramona Stark is an 89-year-old female who presented to [facility] emergency room due to intractable back pain and physical debility with 

gait disturbance.  She was evaluated in emergency room lumbar spine x-ray 

revealed evidence of a wedge fracture of L1 vertebrae.  Patient was having 

difficulty standing up or walking, she was admitted to medical floor, 

neurosurgical consultation was requested.


Patient stated that she had a fall about 1 month ago, she was having some mild 

back pain however she started having more severe pain about 1 week ago, she 

denies having any more recent falls.  Patient developed difficulty getting up 

standing and walking due to severe pain.  She was evaluated in emergency room 

and was started on pain medication and was admitted to medical floor.





On 02/10/2019 patient was seen and examined on the medical floor she is alert 

and oriented 3 in no apparent distress she is complaining of back pain 

otherwise no complaints there is no fever or chills no headache or dizziness no 

chest pain no shortness of breath no cough no nausea or vomiting no abdominal 

pain no diarrhea and no urinary symptoms





Objective





- Vital Signs


Vital signs: 


 Vital Signs











Temp  97.6 F   02/10/19 07:00


 


Pulse  83   02/10/19 07:00


 


Resp  17   02/10/19 07:00


 


BP  180/78   02/10/19 07:00


 


Pulse Ox  98   02/10/19 01:00








 Intake & Output











 02/09/19 02/10/19 02/10/19





 18:59 06:59 18:59


 


Intake Total 500 1165 


 


Balance 500 1165 


 


Intake:   


 


  Intake, IV Titration  160 





  Amount   


 


    Sodium Chloride 0.9% 1,  160 





    000 ml @ 20 mls/hr IV .   





    [id number]   


 


  Oral 500 1005 


 


Other:   


 


  # Voids 1 3 1














- Exam





In general patient is alert and oriented 3 in no apparent distress


HEENT head normocephalic and atraumatic


Neck is supple no JVD no goiter no lymphadenopathy


Chest exam reveals a few scattered crackles no wheezing


Cardiac exam reveals regular heart sounds S1 and S2 no gallops no murmurs


Abdomen is soft nontender no organomegaly with normal bowel sounds


Extremity exam reveals no edema no cyanosis or clubbing


Neurological examination reveals no gross focal deficit








Assessment and Plan


Plan: 





#1 L1 wedge vertebral fracture


#2 intractable back pain


#3 physical debility with difficulty standing up and walking


#4 underlying history of hypertension well-controlled on lisinopril


#5 underlying history of anxiety disorder





At this time patient is admitted to medical floor


Neurosurgery consultation was requested patient will be fitted for LSO brace


Pain management with oral tramadol and oral Norco as needed


Physical therapy consultation was requested for evaluation and treatment 

patient may need to be transferred to a rehab unit after this acute admission


For DVT prophylaxis subcu Lovenox for GI prophylaxis oral Pepcid


Will follow closely

## 2019-02-10 NOTE — P.PN
Progress Note - Text


Progress Note Date: 02/10/19











Patient is seen and examined today at bedside.  She feels that her pain may 

have improved but it is difficult to determine if it is due to the tight lauded 

that she received or the steroid.  She is having some symptoms of radiculopathy 

at her right lower extremity.  She is not having weakness.  Pain is being 

controlled with medication, but she still feels that the pain is quite severe 

for her.





Physical Exam





Afebrile with stable vital signs


Abdomen is soft nontender.  Chest has good excursion deep and space expiration


Extremities have not had neurologic change from from yesterday she has 

sustained dorsal trochanter flexion and EHL.  She is able to flex her legs up 

off the bed independently..


Calves and thighs were soft nontender without evidence of DVT.





Assessment/Plan





The patient has a L1 fracture 


Shows a significant degenerative disc disease and spondylosis her lower lumbar 

spine with lower extremity radiculopathy 





She is not able to tolerate the brace due to sensitivity with anything touching 

her lower back.  They're attempting another brace for her.  


We will continue the IV steroid medications see if this alleviates some of her 

degenerative exacerbation.


With her radicular symptoms she may have some benefit with interventional pain 

management.  She is not a good candidate for surgical intervention and we do 

not have any plans for surgery for her.  We will counseling interventional pain 

management


She may need placement post hospitalization counseled case management


We will continue to increase the patient's mobilization with therapy.  


We will continue pain control with oral or IV medications.  We'll continue to 

follow patient closely.

## 2019-02-10 NOTE — P.CONS
History of Present Illness





- Reason for Consult


Consult date: 02/10/19





- History of Present Illness


This is 89 years old female, with a one-to two weeks history of severe low back 

pain, patient was admitted to [facility] because of increasing 

low back pain that is not manageable as an outpatient, computed tomography scan 

showed patient had a compression fracture at L1, patient was evaluated by 

orthopedic surgery, and recommended conservative treatment, patient reported 

that most of her pain localized in the back area, with the relatively radiated 

to the lower extremity, she denies any fever or night sweats, she denies any 

change in the bowel movement or urination, she had no motor or sensory deficit, 

patient reported that her pain improved significantly today after she was 

started on IV steroids, and also she started on oral  pain medication





Past Medical History


Past Medical History: Cancer, Hypertension


Additional Past Medical History / Comment(s): Scoliosis; Osteoporosis; Uterine 

cancer (1970s)


History of Any Multi-Drug Resistant Organisms: None Reported


Past Surgical History: Cholecystectomy, Hysterectomy


Additional Past Surgical History / Comment(s): Lap Nissen


Past Psychological History: Anxiety


Smoking Status: Current every day smoker


Past Alcohol Use History: None Reported


Past Drug Use History: None Reported





Medications and Allergies


 Home Medications











 Medication  Instructions  Recorded  Confirmed  Type


 


Calcium Carbonate [Calcium] 600 mg PO DAILY 05/11/17 02/09/19 History


 


Cholecalciferol [Vitamin D3] 1,000 unit PO DAILY 05/11/17 02/09/19 History


 


Lisinopril [Zestril] 10 mg PO DAILY 05/11/17 02/09/19 History


 


traMADol HCL [Ultram] 50 mg PO QID 05/11/17 02/09/19 History


 


Multivitamins, Thera [Multivitamin 1 tab PO DAILY 01/25/19 02/09/19 History





(formulary)]    


 


Timolol 0.5% Ophth Soln [Timoptic 1 drop LEFT EYE DAILY 01/25/19 02/09/19 

History





0.5% Ophth Soln]    


 


Vit C/E/Zn/Coppr/Lutein/Zeaxan 1 cap PO BID 01/25/19 02/09/19 History





[Preservision Areds 2 Softgel]    


 


ALPRAZolam [Xanax] 0.25 mg PO BID PRN 02/09/19 02/09/19 History


 


oxyCODONE-APAP 7.5-325MG [Percocet 1 tab PO Q8H PRN 02/09/19 02/09/19 History





7.5-325 mg]    











 Allergies











Allergy/AdvReac Type Severity Reaction Status Date / Time


 


aspirin AdvReac  Nausea & Verified 02/09/19 10:57





   Vomiting  














Physical Exam


Vitals: 


 Vital Signs











  Temp Pulse Resp BP Pulse Ox


 


 02/10/19 15:25  97.6 F  86  17  137/64 


 


 02/10/19 07:00  97.6 F  83  17  180/78 


 


 02/10/19 01:00  97.4 F L  66  16  124/54  98


 


 02/10/19 00:55    16  


 


 02/09/19 20:15   96  16  


 


 02/09/19 20:00  98.1 F  96  16  171/81  100








 Intake and Output











 02/10/19 02/10/19 02/10/19





 06:59 14:59 22:59


 


Intake Total 685  


 


Balance 685  


 


Intake:   


 


  Intake, IV Titration 160  





  Amount   


 


    Sodium Chloride 0.9% 1, 160  





    000 ml @ 20 mls/hr IV .   





    Q24H [facility] Rx#:[id number]   


 


  Oral 525  


 


Other:   


 


  # Voids 3 2 











    


   Physical Examinations  :


    -Constitutiona         : Cooperative , not in acute distress .


    -HEENT                   :  nech ;  supple ,  no Lymphadenopathy  , normal  

thyroid  size .


                                           eyes  :  no ptosis , no icterus,  no 

photophobia .  


                                           ENT  : normal   of hearing  , normal

  oropharynx     , no Thrush .  


    - Respiratory         : Chest clear to auscultations Bilaterally  ,  no 

wheezing   , no Rhonchi   .  


    - Cardiovascula   : regular rate and rhythem , S1 ,  S2  ,   no  S3 ,  no  

S4.


    - Gastrointestina   :  abdomen soft  no tenderness , bowel sounds  , no 

organomegally  .


    - Genitourinary      :   Defferred .                                       

                                                                               

                                                                               

                                                                               

                                                                               

                 


    - neurologic           :   Cranial nerve II   to  XII  intact ,  no   focal 

neurological deffecit  .


    -psychatric             : alert ,  oriented  X 3  ,   appropriate affect   

, intact judgment  and insight  .  


    -Lymphatic             :    no Lymphadenopathy .


   - musculoskeltal   :     


                                         Lumber spine


                                         moter stegnth lower extremities ,thigh 

and legs  4/5 Right side ,  4/5  Left side 


                                         deep tendon reflexes :   normal  Knee 

Jerk    , normal   ankle Jerk  


                                         positive  lumber facet Loading Test 


                                         Range of motion of the lumbar spine  

Flexion  45degrees,   extension   10 degrees


                                         strait leg raising test negative 

bilaterally  


                                         Fabere test negative bilaterally


                                         Sever tenderness over the upper lumbar 

area in the lumbar paravertebral muscles  


                                         [place]  test negative bilaterally.


                                         Seated flexion test negative 

bilaterally.





Results


Comments: 


Computed tomography scan of the lumbar spine= L1 compression fracture, lumbar 

degenerative disc L4 5 and L5-S1 lumbar spondylosis with lumbar facet 

arthropathy





Assessment and Plan


Plan: 


Assessment and plan= acute low back pain secondary to lumbar compression 

fracture, at L1 , and lumbar spondylosis, with secondary muscle spasm in the 

lumbar area, patient had no focal neurological deficit , Dr. Marie orthopedic 

spine surgeon recommended LSO Brace , also  patient was started on IV steroid 

and oral pain medication pain medication ,and she reports that her pain 

improved significantly ,with the current medication, discussed with the patient 

option of doing a trigger point injection ,and lumbar epidural steroid injection

, patient preferred to continue ,the current medication management, and she 

preferred not to have, pain management interventions, patient can follow-up 

with the pain clinic as an outpatient


Time with Patient: Less than 30

## 2019-02-11 VITALS — TEMPERATURE: 97.5 F | HEART RATE: 63 BPM | DIASTOLIC BLOOD PRESSURE: 75 MMHG | SYSTOLIC BLOOD PRESSURE: 150 MMHG

## 2019-02-11 VITALS — RESPIRATION RATE: 16 BRPM

## 2019-02-11 LAB
ALBUMIN SERPL-MCNC: 3.6 G/DL (ref 3.5–5)
ALP SERPL-CCNC: 106 U/L (ref 38–126)
ALT SERPL-CCNC: 31 U/L (ref 9–52)
ANION GAP SERPL CALC-SCNC: 7 MMOL/L
AST SERPL-CCNC: 29 U/L (ref 14–36)
BASOPHILS # BLD AUTO: 0.1 K/UL (ref 0–0.2)
BASOPHILS NFR BLD AUTO: 1 %
BUN SERPL-SCNC: 25 MG/DL (ref 7–17)
CALCIUM SPEC-MCNC: 9.9 MG/DL (ref 8.4–10.2)
CHLORIDE SERPL-SCNC: 103 MMOL/L (ref 98–107)
CO2 SERPL-SCNC: 28 MMOL/L (ref 22–30)
EOSINOPHIL # BLD AUTO: 0.2 K/UL (ref 0–0.7)
EOSINOPHIL NFR BLD AUTO: 2 %
ERYTHROCYTE [DISTWIDTH] IN BLOOD BY AUTOMATED COUNT: 3.5 M/UL (ref 3.8–5.4)
ERYTHROCYTE [DISTWIDTH] IN BLOOD: 13.5 % (ref 11.5–15.5)
GLUCOSE SERPL-MCNC: 97 MG/DL (ref 74–99)
HCT VFR BLD AUTO: 31.7 % (ref 34–46)
HGB BLD-MCNC: 9.9 GM/DL (ref 11.4–16)
LYMPHOCYTES # SPEC AUTO: 1.7 K/UL (ref 1–4.8)
LYMPHOCYTES NFR SPEC AUTO: 19 %
MCH RBC QN AUTO: 28.3 PG (ref 25–35)
MCHC RBC AUTO-ENTMCNC: 31.3 G/DL (ref 31–37)
MCV RBC AUTO: 90.4 FL (ref 80–100)
MONOCYTES # BLD AUTO: 0.8 K/UL (ref 0–1)
MONOCYTES NFR BLD AUTO: 9 %
NEUTROPHILS # BLD AUTO: 6.1 K/UL (ref 1.3–7.7)
NEUTROPHILS NFR BLD AUTO: 68 %
PLATELET # BLD AUTO: 448 K/UL (ref 150–450)
POTASSIUM SERPL-SCNC: 4.5 MMOL/L (ref 3.5–5.1)
PROT SERPL-MCNC: 6.2 G/DL (ref 6.3–8.2)
SODIUM SERPL-SCNC: 138 MMOL/L (ref 137–145)
WBC # BLD AUTO: 8.9 K/UL (ref 3.8–10.6)

## 2019-02-11 RX ADMIN — Medication SCH UNIT: at 08:43

## 2019-02-11 RX ADMIN — HYDROCODONE BITARTRATE AND ACETAMINOPHEN PRN EACH: 5; 325 TABLET ORAL at 04:21

## 2019-02-11 RX ADMIN — CALCIUM CARBONATE (ANTACID) CHEW TAB 500 MG SCH MG: 500 CHEW TAB at 08:42

## 2019-02-11 RX ADMIN — HYDROCODONE BITARTRATE AND ACETAMINOPHEN PRN EACH: 5; 325 TABLET ORAL at 13:58

## 2019-02-11 RX ADMIN — ENOXAPARIN SODIUM SCH MG: 40 INJECTION SUBCUTANEOUS at 08:43

## 2019-02-11 RX ADMIN — FAMOTIDINE SCH MG: 20 TABLET, FILM COATED ORAL at 08:43

## 2019-02-11 RX ADMIN — LISINOPRIL SCH MG: 10 TABLET ORAL at 08:41

## 2019-02-11 RX ADMIN — CEFAZOLIN SCH: 330 INJECTION, POWDER, FOR SOLUTION INTRAMUSCULAR; INTRAVENOUS at 09:42

## 2019-02-11 RX ADMIN — THERA TABS SCH EACH: TAB at 08:41

## 2019-02-11 RX ADMIN — METHYLPREDNISOLONE SODIUM SUCCINATE SCH MG: 125 INJECTION, POWDER, FOR SOLUTION INTRAMUSCULAR; INTRAVENOUS at 08:43

## 2019-02-11 NOTE — P.DS
Providers


Date of admission: 


02/09/19 07:17





Expected date of discharge: 02/11/19


Attending physician: 


Sergio Almanza





Consults: 





 





02/09/19 07:21


Consult Physician Routine 


   Consulting Provider: BARRIE Marie


   Consult Reason/Comments: intractable back pain, L1 compression fracture


   Do you want consulting provider notified?: Yes





02/10/19 11:07


Consult to Anesthesia Routine 


   Consulting Provider: Anesthesia,Services


   Consult Reason/Comments: Open pain with right lower extremity radiculopathy











Primary care physician: 


[facility] Course: 





Discharge diagnosis


#1 L1 wedge vertebral fracture


#2 intractable back pain


#3 physical debility with difficulty standing up and walking


#4 underlying history of hypertension well-controlled on lisinopril


#5 underlying history of anxiety disorder


#6 iron deficiency anemia.  Hemoglobin 9.9 patient started on ferrous sulfate.  

Patient to follow-up with her PCP





Patient was evaluated by orthopedic services.  2 LSO braces were attempted but 

were not able to be tolerated by patient.  Per spinal surgery patient will be DC

'd on prednisone taper and no brace at this time.  Patient to follow-up 

outpatient.  Patient will continue tramadol and Norco for pain control.  

Patient has been cleared for discharge from spinal surgery. 











Hospital course








Ramona Stark is an 89-year-old female who presented to [facility] emergency room due to intractable back pain and physical debility with 

gait disturbance.  She was evaluated in emergency room lumbar spine x-ray 

revealed evidence of a wedge fracture of L1 vertebrae.  Patient was having 

difficulty standing up or walking, she was admitted to medical floor, 

neurosurgical consultation was requested.


Patient stated that she had a fall about 1 month ago, she was having some mild 

back pain however she started having more severe pain about 1 week ago, she 

denies having any more recent falls.  Patient developed difficulty getting up 

standing and walking due to severe pain.  She was evaluated in emergency room 

and was started on pain medication and was admitted to medical floor.





On 02/10/2019 patient was seen and examined on the medical floor she is alert 

and oriented 3 in no apparent distress she is complaining of back pain 

otherwise no complaints there is no fever or chills no headache or dizziness no 

chest pain no shortness of breath no cough no nausea or vomiting no abdominal 

pain no diarrhea and no urinary symptoms





On 02/11/2019 patient maintained alert and oriented 3.  Family at bedside.  

Patient states improvement with back pain.  Patient especially she is eager to 

be DC'd home.  Patient planned to be DC'd home daughter.  Patient was evaluated 

with a spinal surgery.  LSO braces were attempted but were not able to be 

tolerated by patient.  Per spinal surgery patient will be DC'd on prednisone 

tapering no brace at this time.  Patient to follow-up outpatient.  Patient 

denies shortness breath.  Patient denies nausea vomiting or diarrhea.  Patient 

denies any urinary burning or frequency





I performed an examination of the patient and discussed their management with 

the Nurse Practitioner.  I have reviewed the Nurse Practitioner's notes and 

agree with the documented findings and plan of care 


Patient Condition at Discharge: Stable





Plan - Discharge Summary


Discharge Rx Participant: No


New Discharge Prescriptions: 


New


   predniSONE See Taper PO AS DIRECTED #24 tab





No Action


   traMADol HCL [Ultram] 50 mg PO QID


   Cholecalciferol [Vitamin D3] 1,000 unit PO DAILY


   Lisinopril [Zestril] 10 mg PO DAILY


   Calcium Carbonate [Calcium] 600 mg PO DAILY


   Vit C/E/Zn/Coppr/Lutein/Zeaxan [Preservision Areds 2 Softgel] 1 cap PO BID


   Timolol 0.5% Ophth Soln [Timoptic 0.5% Ophth Soln] 1 drop LEFT EYE DAILY


   Multivitamins, Thera [Multivitamin (formulary)] 1 tab PO DAILY


   oxyCODONE-APAP 7.5-325MG [Percocet 7.5-325 mg] 1 tab PO Q8H PRN


     PRN Reason: Pain


   ALPRAZolam [Xanax] 0.25 mg PO BID PRN


     PRN Reason: Anxiety


Discharge Medication List





Calcium Carbonate [Calcium] 600 mg PO DAILY 05/11/17 [History]


Cholecalciferol [Vitamin D3] 1,000 unit PO DAILY 05/11/17 [History]


Lisinopril [Zestril] 10 mg PO DAILY 05/11/17 [History]


traMADol HCL [Ultram] 50 mg PO QID 05/11/17 [History]


Multivitamins, Thera [Multivitamin (formulary)] 1 tab PO DAILY 01/25/19 [History

]


Timolol 0.5% Ophth Soln [Timoptic 0.5% Ophth Soln] 1 drop LEFT EYE DAILY 01/25/ 19 [History]


Vit C/E/Zn/Coppr/Lutein/Zeaxan [Preservision Areds 2 Softgel] 1 cap PO BID 01/25 /19 [History]


ALPRAZolam [Xanax] 0.25 mg PO BID PRN 02/09/19 [History]


oxyCODONE-APAP 7.5-325MG [Percocet 7.5-325 mg] 1 tab PO Q8H PRN 02/09/19 [

History]


predniSONE See Taper PO AS DIRECTED #24 tab 02/11/19 [Rx]








Follow up Appointment(s)/Referral(s): 


Shazia Dennison DO [Primary Care Provider] - 1-2 days


Jose Eduardo Tran PAC [PHYSICIAN ASSISTANT] - 2 Weeks


(Patient may follow-up with Jose Eduardo Tran PA-C or Dr. Nael Marie at [facility] in 2-3 weeks following discharge.


)


Activity/Diet/Wound Care/Special Instructions: 


1.  Patient should avoid excessive bending, twisting, and lifting; no lifting 

greater than 10 pounds


2.  Take medications as prescribed

## 2019-02-11 NOTE — P.PN
Progress Note - Text


Progress Note Date: 02/11/19





Patient is a pleasant 89-year-old female who is seen and examined at bedside 

for follow-up evaluation in regards to L1 compression fracture and low back 

pain.  Since being seen and examined yesterday, patient does not feel her pain 

has significantly improved but states her pain is currently controlled at the 

bedside after receiving pain medication this morning.  She states her symptoms 

may be exacerbated for no apparent reason are quite severe and significant.  

She denies lower extremity weakness or radiculopathy currently.  She previously 

experience some right lower extremity radiculopathy.  When her pain is severe 

she has pain in the lumbar spine that radiates laterally towards the hips 

bilaterally.  Prescription for a brace was prescribed but she states she has 

been unable to wear either brace that was fitted as any pressure to her spine 

exacerbates her symptoms.  She has been prescribed a steroid IV during her 

admission.  She feels this may have provided some improvement of her symptoms.  

She was seen and examined yesterday by pain management who discussed the 

possibility of injections.  At that time she declined to undergo injections.  

She states at the bedside again today she does not wish to go through 

injections.  It has been discussed the possibility of discharge to a 

rehabilitation facility.  She states she does not wish to be discharged to a 

rehabilitation facility and she plans on leaving the hospital today.  She 

states she feels better home in her own setting.  We discussed her ability to 

manage her pain at home and she does not seem to be concerned with this in 

outpatient setting.  He has been able to adequately to the restroom.








Physical exam: 


Patient is awake, alert, and oriented 3


Vital signs stable


Good chest excursion with deep inspiration and expiration


Abdomen soft nontender


Examination of lumbar spine reveals skin is intact with no abrasions, serrations

, or bruises; no erythema, purulence or signs of infection


Dorsiflexion, plantarflexion, and extensor hallucis longus positive sustained 

bilaterally


Patient is able to lift legs independently off the bed without significant 

difficulty


Straight leg test negative bilateral lower extremities


No signs or symptoms of DVT; no calf pain


No pain with internal and external rotation of the hips bilaterally


Neurovascularly intact








Assessment:


L1 compression fracture deformity


Low back pain


Lumbar significant degenerative disc disease and spondylosis








Plan:


1.  Patient does have evidence of L1 compression fracture deformity.  She has 

had significant pain at her lower lumbar spine.  She has been unable to 

tolerate bracing for her compression fracture deformity.  She has had some 

improvement of her symptoms with pain medication and steroid medication during 

her admission.  It was discussed the possibility of discharge to rehabilitation 

facility.  Patient declines this today and states she would like to be 

discharged home today.  We did discuss we will plan for a prescription of a 

steroid taper at the time of discharge.  Patient feels this is a good plan of 

care and will plan to take this medication as prescribed until completion.  She 

will be given a prescription for a 12 day 10 mg prednisone taper.  She declines 

further bracing options due to being unable to tolerate the braces that were 

tried for her.  Due to her L1 fracture, she should avoid excessive bending, 

twisting, lifting; no lifting greater than 10 pounds.  From an orthopedic spine 

standpoint, she is stable for discharge to a rehabilitation facility or home if 

medicine feels patient is stable enough to be discharged home.  Following 

discharge, we'll plan outpatient follow-up in approximately 2-3 weeks in the 

outpatient setting for further evaluation.


2.  Patient will continue to be seen and examined by medicine


3.  Patient has been discussed in detail with Dr. Nael Marie and he agrees with 

this plan

## 2019-03-17 ENCOUNTER — HOSPITAL ENCOUNTER (INPATIENT)
Dept: HOSPITAL 47 - EC | Age: 84
LOS: 2 days | Discharge: HOME HEALTH | DRG: 690 | End: 2019-03-19
Payer: MEDICARE

## 2019-03-17 DIAGNOSIS — K56.41: ICD-10-CM

## 2019-03-17 DIAGNOSIS — D50.9: ICD-10-CM

## 2019-03-17 DIAGNOSIS — I10: ICD-10-CM

## 2019-03-17 DIAGNOSIS — Z85.42: ICD-10-CM

## 2019-03-17 DIAGNOSIS — K91.86: ICD-10-CM

## 2019-03-17 DIAGNOSIS — R54: ICD-10-CM

## 2019-03-17 DIAGNOSIS — N21.0: ICD-10-CM

## 2019-03-17 DIAGNOSIS — G89.29: ICD-10-CM

## 2019-03-17 DIAGNOSIS — N81.10: ICD-10-CM

## 2019-03-17 DIAGNOSIS — Z87.440: ICD-10-CM

## 2019-03-17 DIAGNOSIS — M81.0: ICD-10-CM

## 2019-03-17 DIAGNOSIS — Z90.49: ICD-10-CM

## 2019-03-17 DIAGNOSIS — F41.9: ICD-10-CM

## 2019-03-17 DIAGNOSIS — N81.6: ICD-10-CM

## 2019-03-17 DIAGNOSIS — Z90.710: ICD-10-CM

## 2019-03-17 DIAGNOSIS — Z88.8: ICD-10-CM

## 2019-03-17 DIAGNOSIS — Z80.9: ICD-10-CM

## 2019-03-17 DIAGNOSIS — F17.200: ICD-10-CM

## 2019-03-17 DIAGNOSIS — Z83.3: ICD-10-CM

## 2019-03-17 DIAGNOSIS — S32.010S: ICD-10-CM

## 2019-03-17 DIAGNOSIS — M41.9: ICD-10-CM

## 2019-03-17 DIAGNOSIS — Z79.899: ICD-10-CM

## 2019-03-17 DIAGNOSIS — R33.8: ICD-10-CM

## 2019-03-17 DIAGNOSIS — N13.6: Primary | ICD-10-CM

## 2019-03-17 DIAGNOSIS — Z79.891: ICD-10-CM

## 2019-03-17 LAB
ALBUMIN SERPL-MCNC: 3.8 G/DL (ref 3.5–5)
ALP SERPL-CCNC: 142 U/L (ref 38–126)
ALT SERPL-CCNC: 46 U/L (ref 9–52)
ANION GAP SERPL CALC-SCNC: 10 MMOL/L
AST SERPL-CCNC: 50 U/L (ref 14–36)
BASOPHILS # BLD AUTO: 0.1 K/UL (ref 0–0.2)
BASOPHILS NFR BLD AUTO: 0 %
BUN SERPL-SCNC: 23 MG/DL (ref 7–17)
CALCIUM SPEC-MCNC: 10.2 MG/DL (ref 8.4–10.2)
CHLORIDE SERPL-SCNC: 101 MMOL/L (ref 98–107)
CO2 SERPL-SCNC: 27 MMOL/L (ref 22–30)
EOSINOPHIL # BLD AUTO: 0.2 K/UL (ref 0–0.7)
EOSINOPHIL NFR BLD AUTO: 1 %
ERYTHROCYTE [DISTWIDTH] IN BLOOD BY AUTOMATED COUNT: 4.11 M/UL (ref 3.8–5.4)
ERYTHROCYTE [DISTWIDTH] IN BLOOD: 14.5 % (ref 11.5–15.5)
GLUCOSE SERPL-MCNC: 113 MG/DL (ref 74–99)
HCT VFR BLD AUTO: 36.2 % (ref 34–46)
HGB BLD-MCNC: 11.3 GM/DL (ref 11.4–16)
LYMPHOCYTES # SPEC AUTO: 1.4 K/UL (ref 1–4.8)
LYMPHOCYTES NFR SPEC AUTO: 8 %
MCH RBC QN AUTO: 27.6 PG (ref 25–35)
MCHC RBC AUTO-ENTMCNC: 31.3 G/DL (ref 31–37)
MCV RBC AUTO: 88.2 FL (ref 80–100)
MONOCYTES # BLD AUTO: 1.3 K/UL (ref 0–1)
MONOCYTES NFR BLD AUTO: 8 %
NEUTROPHILS # BLD AUTO: 13.8 K/UL (ref 1.3–7.7)
NEUTROPHILS NFR BLD AUTO: 82 %
PH UR: 6.5 [PH] (ref 5–8)
PLATELET # BLD AUTO: 395 K/UL (ref 150–450)
POTASSIUM SERPL-SCNC: 4.7 MMOL/L (ref 3.5–5.1)
PROT SERPL-MCNC: 6.6 G/DL (ref 6.3–8.2)
PROT UR QL: (no result)
RBC UR QL: 74 /HPF (ref 0–5)
SODIUM SERPL-SCNC: 138 MMOL/L (ref 137–145)
SP GR UR: 1.01 (ref 1–1.03)
UROBILINOGEN UR QL STRIP: 2 MG/DL (ref ?–2)
WBC # BLD AUTO: 16.8 K/UL (ref 3.8–10.6)

## 2019-03-17 PROCEDURE — 85025 COMPLETE CBC W/AUTO DIFF WBC: CPT

## 2019-03-17 PROCEDURE — 36415 COLL VENOUS BLD VENIPUNCTURE: CPT

## 2019-03-17 PROCEDURE — 87086 URINE CULTURE/COLONY COUNT: CPT

## 2019-03-17 PROCEDURE — 99285 EMERGENCY DEPT VISIT HI MDM: CPT

## 2019-03-17 PROCEDURE — 81001 URINALYSIS AUTO W/SCOPE: CPT

## 2019-03-17 PROCEDURE — 80053 COMPREHEN METABOLIC PANEL: CPT

## 2019-03-17 PROCEDURE — 74018 RADEX ABDOMEN 1 VIEW: CPT

## 2019-03-17 PROCEDURE — 93005 ELECTROCARDIOGRAM TRACING: CPT

## 2019-03-17 PROCEDURE — 76700 US EXAM ABDOM COMPLETE: CPT

## 2019-03-17 PROCEDURE — 87040 BLOOD CULTURE FOR BACTERIA: CPT

## 2019-03-17 PROCEDURE — 96361 HYDRATE IV INFUSION ADD-ON: CPT

## 2019-03-17 PROCEDURE — 83605 ASSAY OF LACTIC ACID: CPT

## 2019-03-17 PROCEDURE — 96374 THER/PROPH/DIAG INJ IV PUSH: CPT

## 2019-03-17 RX ADMIN — DESIPRAMINE HYDROCHLORIDE SCH MG: 25 TABLET, FILM COATED ORAL at 20:37

## 2019-03-17 RX ADMIN — Medication SCH MG: at 20:37

## 2019-03-17 RX ADMIN — CEFAZOLIN SCH MLS/HR: 330 INJECTION, POWDER, FOR SOLUTION INTRAMUSCULAR; INTRAVENOUS at 20:40

## 2019-03-17 NOTE — XR
EXAMINATION TYPE: XR KUB

 

DATE OF EXAM: 3/17/2019

 

COMPARISON: 5/3/2013

 

HISTORY: Back pain

 

TECHNIQUE: Single view

 

FINDINGS: There is no sign of intestinal obstruction or pneumoperitoneum. Fecal pattern is normal. Th
ere are numerous calcified gallstones. Lung bases are clear. There is atherosclerotic vascular calcif
ication. There are clips in the right upper quadrant also.

 

IMPRESSION: Nonacute abdomen. There appears to be multiple gallstones and clips from cholecystectomy.
 Correlation with the surgical history is needed.

## 2019-03-17 NOTE — ED
Abdominal Pain HPI





- General


Chief Complaint: Abdominal Pain


Stated Complaint: Constipated


Time Seen by Provider: 03/17/19 14:11


Source: patient, RN notes reviewed, old records reviewed


Mode of arrival: ambulatory


Limitations: no limitations





- History of Present Illness


Initial Comments: 





89 year old female presents today with complaints of constipation.  Patient's 

not had a bowel movement in the past 6 days.  Patient has had a history of 

lumbar spine fracture and is currently on multiple pain medications.  Patient 

states that she's also feels like she has urinary retention.  Reports she had a 

small amount of urine leakage earlier today.  Patient's daughter reports she's 

had multiple stool softeners and attempted to disimpact Patient today but she is

unable to tolerate this.  Patient states that she's had no vomiting episodes.





- Related Data


                                Home Medications











 Medication  Instructions  Recorded  Confirmed


 


Calcium Carbonate [Calcium] 600 mg PO DAILY 05/11/17 02/09/19


 


Cholecalciferol [Vitamin D3] 1,000 unit PO DAILY 05/11/17 02/09/19


 


Lisinopril [Zestril] 10 mg PO DAILY 05/11/17 02/09/19


 


traMADol HCL [Ultram] 50 mg PO QID 05/11/17 02/09/19


 


Multivitamins, Thera [Multivitamin 1 tab PO DAILY 01/25/19 02/09/19





(formulary)]   


 


Timolol 0.5% Ophth Soln [Timoptic 1 drop LEFT EYE DAILY 01/25/19 02/09/19





0.5% Ophth Soln]   


 


Vit C/E/Zn/Coppr/Lutein/Zeaxan 1 cap PO BID 01/25/19 02/09/19





[Preservision Areds 2 Softgel]   


 


ALPRAZolam [Xanax] 0.25 mg PO BID PRN 02/09/19 02/09/19








                                  Previous Rx's











 Medication  Instructions  Recorded


 


Ferrous Sulfate [Iron (65  mg PO BID 30 Days #60 tab 02/11/19





Elemental)]  


 


HYDROcodone/APAP 5-325MG [Norco 1 each PO Q6HR PRN 3 Days #12 tab 02/11/19





5-325]  


 


predniSONE See Taper PO AS DIRECTED #24 tab 02/11/19











                                    Allergies











Allergy/AdvReac Type Severity Reaction Status Date / Time


 


aspirin AdvReac  Nausea & Verified 02/09/19 10:57





   Vomiting  














Review of Systems


ROS Statement: 


Those systems with pertinent positive or pertinent negative responses have been 

documented in the HPI.





ROS Other: All systems not noted in ROS Statement are negative.





Past Medical History


Past Medical History: Cancer, Hypertension


Additional Past Medical History / Comment(s): Scoliosis; Osteoporosis; Uterine 

cancer (1970s)


History of Any Multi-Drug Resistant Organisms: None Reported


Past Surgical History: Cholecystectomy, Hysterectomy


Additional Past Surgical History / Comment(s): Lap Nissen


Past Psychological History: Anxiety


Smoking Status: Current every day smoker


Past Alcohol Use History: None Reported


Past Drug Use History: None Reported





General Exam





- General Exam Comments


Initial Comments: 





This is a very frail 89-year-old female.  Alert and oriented 3.


Limitations: no limitations


General appearance: alert, in no apparent distress


Head exam: Present: atraumatic, normocephalic, normal inspection


Eye exam: Present: normal appearance, PERRL, EOMI.  Absent: scleral icterus, 

conjunctival injection, periorbital swelling


ENT exam: Present: normal exam, mucous membranes moist


Neck exam: Present: normal inspection.  Absent: tenderness, meningismus, 

lymphadenopathy


Respiratory exam: Present: normal lung sounds bilaterally.  Absent: respiratory 

distress, wheezes, rales, rhonchi, stridor


Cardiovascular Exam: Present: regular rate, normal rhythm, normal heart sounds. 

Absent: systolic murmur, diastolic murmur, rubs, gallop, clicks


GI/Abdominal exam: Present: soft, distended, tenderness (2.  Suprapubic 

distended area and tenderness.), normal bowel sounds.  Absent: guarding, 

rebound, rigid


Rectal exam: Present: fecal impaction (Patient has a very large fecal impactio

n.).  Absent: normal inspection, normal rectal tone


External exam: Present: normal external exam


Extremities exam: Present: normal inspection, full ROM, normal capillary refill.

 Absent: tenderness, pedal edema, joint swelling, calf tenderness


Back exam: Present: normal inspection


Neurological exam: Present: alert, oriented X3, CN II-XII intact


Psychiatric exam: Present: normal affect, normal mood


Skin exam: Present: warm, dry, intact, normal color.  Absent: rash





Course


                                   Vital Signs











  03/17/19





  14:06


 


Temperature 97.9 F


 


Pulse Rate 105 H


 


Respiratory 16





Rate 


 


Blood Pressure 124/65


 


O2 Sat by Pulse 97





Oximetry 














Medical Decision Making





- Medical Decision Making





Patient is an 89-year-old female presents emergency department with complaints 

of 60s constipation.  She subsequently developed urinary retention due to the 

large fecal impaction.  Patient had over 1 L of urine within the bladder.  A 

Arciniega catheter was initiated.  Urine sample was obtained and show significant 

infection.  Patient's family reports she's been on multiple anabiotic C the past

2 months for UTIs.  She last completed an antibiotic 3 weeks ago of unknown ant

ibiotic at this time.  Patient was given a therapeutic enema and milk and 

molasses enema and I digitally disimpacted the Patient.  Patient was very weak 

and fatigued while trying to have a bowel movement.  She had a few near vagal 

episodes.  At this point Patient started on IV fluids and given antibiotics for 

urinary tract infection.  She is very weak and frail.  Urine culture is pending.

 Patient was given a gram of Rocephin at this time.  I discussed the case is Dr. Pedroza Will discuss case with Dr. Harper.  Bernie the Patient for urinary 

retention, frequent urinary tract infections and weakness.





- Lab Data


                                   Lab Results











  03/17/19 Range/Units





  15:30 


 


Urine Color  Yellow  


 


Urine Appearance  Cloudy H  (Clear)  


 


Urine pH  6.5  (5.0-8.0)  


 


Ur Specific Gravity  1.010  (1.001-1.035)  


 


Urine Protein  1+ H  (Negative)  


 


Urine Glucose (UA)  Negative  (Negative)  


 


Urine Ketones  Negative  (Negative)  


 


Urine Blood  Small H  (Negative)  


 


Urine Nitrite  Negative  (Negative)  


 


Urine Bilirubin  Negative  (Negative)  


 


Urine Urobilinogen  2.0  (<2.0)  mg/dL


 


Ur Leukocyte Esterase  Large H  (Negative)  


 


Urine RBC  74 H  (0-5)  /hpf


 


Urine WBC  >182 H  (0-5)  /hpf


 


Urine WBC Clumps  Rare H  (None)  /hpf


 


Urine Mucus  Rare H  (None)  /hpf














- Radiology Data


Radiology results: report reviewed


Nonacute abdomen.  Appears to be multiple gallstones includes cholecystectomy.  

Patient's correlation for surgical history as needed.





Disposition


Clinical Impression: 


 UTI (urinary tract infection), Urinary retention, Fecal impaction, Weakness, 

Chronic back pain





Disposition: ADMITTED AS IP TO THIS HOSP


Condition: Stable


Is patient prescribed a controlled substance at d/c from ED?: No


Referrals: 


Shazia Dennison DO [Primary Care Provider] - 1-2 days


Time of Disposition: 17:54

## 2019-03-18 LAB
ALBUMIN SERPL-MCNC: 3.4 G/DL (ref 3.5–5)
ALP SERPL-CCNC: 112 U/L (ref 38–126)
ALT SERPL-CCNC: 38 U/L (ref 9–52)
ANION GAP SERPL CALC-SCNC: 6 MMOL/L
AST SERPL-CCNC: 38 U/L (ref 14–36)
BASOPHILS # BLD AUTO: 0 K/UL (ref 0–0.2)
BASOPHILS NFR BLD AUTO: 1 %
BUN SERPL-SCNC: 14 MG/DL (ref 7–17)
CALCIUM SPEC-MCNC: 9.2 MG/DL (ref 8.4–10.2)
CHLORIDE SERPL-SCNC: 104 MMOL/L (ref 98–107)
CO2 SERPL-SCNC: 28 MMOL/L (ref 22–30)
EOSINOPHIL # BLD AUTO: 0.2 K/UL (ref 0–0.7)
EOSINOPHIL NFR BLD AUTO: 3 %
ERYTHROCYTE [DISTWIDTH] IN BLOOD BY AUTOMATED COUNT: 3.66 M/UL (ref 3.8–5.4)
ERYTHROCYTE [DISTWIDTH] IN BLOOD: 14.6 % (ref 11.5–15.5)
GLUCOSE SERPL-MCNC: 145 MG/DL (ref 74–99)
HCT VFR BLD AUTO: 32.7 % (ref 34–46)
HGB BLD-MCNC: 10.2 GM/DL (ref 11.4–16)
LYMPHOCYTES # SPEC AUTO: 0.7 K/UL (ref 1–4.8)
LYMPHOCYTES NFR SPEC AUTO: 8 %
MCH RBC QN AUTO: 27.8 PG (ref 25–35)
MCHC RBC AUTO-ENTMCNC: 31 G/DL (ref 31–37)
MCV RBC AUTO: 89.4 FL (ref 80–100)
MONOCYTES # BLD AUTO: 0.5 K/UL (ref 0–1)
MONOCYTES NFR BLD AUTO: 6 %
NEUTROPHILS # BLD AUTO: 6.9 K/UL (ref 1.3–7.7)
NEUTROPHILS NFR BLD AUTO: 82 %
PLATELET # BLD AUTO: 336 K/UL (ref 150–450)
POTASSIUM SERPL-SCNC: 4.5 MMOL/L (ref 3.5–5.1)
PROT SERPL-MCNC: 6 G/DL (ref 6.3–8.2)
SODIUM SERPL-SCNC: 138 MMOL/L (ref 137–145)
WBC # BLD AUTO: 8.5 K/UL (ref 3.8–10.6)

## 2019-03-18 RX ADMIN — Medication SCH MG: at 20:35

## 2019-03-18 RX ADMIN — TIMOLOL MALEATE SCH DROPS: 5 SOLUTION OPHTHALMIC at 06:47

## 2019-03-18 RX ADMIN — DESIPRAMINE HYDROCHLORIDE SCH MG: 25 TABLET, FILM COATED ORAL at 20:35

## 2019-03-18 RX ADMIN — TAMSULOSIN HYDROCHLORIDE SCH MG: 0.4 CAPSULE ORAL at 13:50

## 2019-03-18 RX ADMIN — TAMSULOSIN HYDROCHLORIDE SCH: 0.4 CAPSULE ORAL at 17:21

## 2019-03-18 RX ADMIN — DOCUSATE SODIUM SCH MG: 100 CAPSULE, LIQUID FILLED ORAL at 20:35

## 2019-03-18 RX ADMIN — Medication SCH MG: at 06:47

## 2019-03-18 RX ADMIN — CALCIUM CARBONATE (ANTACID) CHEW TAB 500 MG SCH MG: 500 CHEW TAB at 06:47

## 2019-03-18 RX ADMIN — DOCUSATE SODIUM SCH MG: 100 CAPSULE, LIQUID FILLED ORAL at 09:19

## 2019-03-18 RX ADMIN — LISINOPRIL SCH MG: 10 TABLET ORAL at 06:47

## 2019-03-18 RX ADMIN — CEFAZOLIN SCH: 330 INJECTION, POWDER, FOR SOLUTION INTRAMUSCULAR; INTRAVENOUS at 23:25

## 2019-03-18 RX ADMIN — OXYCODONE HYDROCHLORIDE AND ACETAMINOPHEN PRN EACH: 7.5; 325 TABLET ORAL at 18:21

## 2019-03-18 RX ADMIN — CEFAZOLIN SCH MLS/HR: 330 INJECTION, POWDER, FOR SOLUTION INTRAMUSCULAR; INTRAVENOUS at 13:48

## 2019-03-18 RX ADMIN — Medication SCH UNIT: at 06:47

## 2019-03-18 RX ADMIN — CALCITONIN SALMON SCH SPRAY: 200 SPRAY, METERED NASAL at 06:48

## 2019-03-18 RX ADMIN — CEFAZOLIN SCH: 330 INJECTION, POWDER, FOR SOLUTION INTRAMUSCULAR; INTRAVENOUS at 06:21

## 2019-03-18 RX ADMIN — THERA TABS SCH EACH: TAB at 13:48

## 2019-03-18 NOTE — P.HPIM
History of Present Illness


H&P Date: 03/18/19





This is a 89-year-old female patient of Dr. Dennison.  Patient presented to the 

emergency room with complaints of constipation, urinary retention and weakness. 

Patient was recently admitted on 02/10/2019 in which she had of L1 wedged 

vertebral fracture.  At that time patient was discharged home with TLSO brace 

and prednisone taper.  Per patient's family patient has been doing well at home.

 Patient's symptoms started 2 days ago.  Prior to admission patient had not had 

bowel movement for 6 days.  Patient was disimpacted in the ER.  Patient reports 

significant relief.  Abdominal x-ray completed showing nonacute abdomen.  There 

appears to be multiple gallstones and clips from cholecystectomy.  Correlation 

with surgical history is needed.  Patient has a past medical history of uterine 

cancer, hypertension, scoliosis, osteoporosis, cholecystectomy and anxiety.  

Patient was found to have urinary tract infection.  Initial white blood cell 

elevated at 16.8.  Patient started on Rocephin for IV antibiotics urine culture 

ordered.  Arciniega catheter is in place.  Colace added.  At this time patient 

denies chest pain or shortness of breath.  Patient denies nausea vomiting or 

diarrhea.  Patient denies any urinary burning or frequency





Review of Systems





Please refer to HPI otherwise unremarkable





Past Medical History


Past Medical History: Cancer, Hypertension


Additional Past Medical History / Comment(s): Scoliosis; Osteoporosis; Uterine 

cancer (1970s), lumbar fracture


History of Any Multi-Drug Resistant Organisms: None Reported


Past Surgical History: Cholecystectomy, Hysterectomy


Additional Past Surgical History / Comment(s): Lap Nissen


Past Anesthesia/Blood Transfusion Reactions: No Reported Reaction


Past Psychological History: Anxiety


Smoking Status: Current some day smoker


Past Alcohol Use History: None Reported


Past Drug Use History: None Reported





- Past Family History


  ** Mother


Family Medical History: Cancer





  ** Father


Family Medical History: Diabetes Mellitus





Medications and Allergies


                                Home Medications











 Medication  Instructions  Recorded  Confirmed  Type


 


RX: Calcium Carbonate [Calcium] 600 mg PO DAILY 05/11/17 03/17/19 History


 


RX: Cholecalciferol [Vitamin D3] 1,000 unit PO DAILY 05/11/17 03/17/19 History


 


RX: Lisinopril [Zestril] 10 mg PO DAILY 05/11/17 03/17/19 History


 


RX: traMADol HCL [Ultram] 50 mg PO QID 05/11/17 03/17/19 History


 


RX: Multivitamins, Thera 1 tab PO DAILY 01/25/19 03/17/19 History





[Multivitamin (formulary)]    


 


RX: Timolol 0.5% Ophth Soln 1 drop LEFT EYE DAILY 01/25/19 03/17/19 History





[Timoptic 0.5% Ophth Soln]    


 


RX: Vit C/E/Zn/Coppr/Lutein/Zeaxan 1 cap PO BID 01/25/19 03/17/19 History





[Preservision Areds 2 Softgel]    


 


RX: ALPRAZolam [Xanax] 0.25 mg PO BID PRN 02/09/19 03/17/19 History


 


RX: Ferrous Sulfate [Iron (65  mg PO BID 30 Days #60 tab 02/11/19 03/17/19

 Rx





Elemental)]    


 


RX: Calcitonin Nasal [Fortical 1 spray EA NOSTRIL DAILY 03/17/19 03/17/19 

History





(Miacalcin)]    


 


RX: Desipramine [Norpramin] 25 mg PO HS 03/17/19 03/17/19 History


 


rOPINIRole HCL [Requip] 0.25 mg PO HS 03/17/19 03/17/19 History








                                    Allergies











Allergy/AdvReac Type Severity Reaction Status Date / Time


 


aspirin AdvReac  Nausea & Verified 03/17/19 17:53





   Vomiting  














Physical Exam


Vitals: 


                                   Vital Signs











  Temp Pulse Pulse Resp BP BP BP


 


 03/18/19 08:56  98.1 F   82  16   143/48 


 


 03/17/19 23:47  98.4 F   91  16    158/78


 


 03/17/19 20:21  97.7 F   92  16    176/79


 


 03/17/19 20:00   78   18  170/88  


 


 03/17/19 17:55      135/88  


 


 03/17/19 14:06  97.9 F  105 H   16  124/65  














  Pulse Ox


 


 03/18/19 08:56  97


 


 03/17/19 23:47  99


 


 03/17/19 20:21  98


 


 03/17/19 20:00  98


 


 03/17/19 17:55 


 


 03/17/19 14:06  97








                                Intake and Output











 03/17/19 03/18/19 03/18/19





 22:59 06:59 14:59


 


Intake Total 200  


 


Output Total 900 1000 


 


Balance -700 -1000 


 


Intake:   


 


  Intake, IV Titration 200  





  Amount   


 


    Sodium Chloride 0.9% 1, 200  





    000 ml @ 100 mls/hr IV .   





    Q10H [facility] Rx#:[id number]   


 


Output:   


 


  Urine 900 1000 


 


Other:   


 


  Voiding Method Indwelling Catheter Indwelling Catheter 














Head normocephalic


Neck supple


Lungs clear to auscultation bilaterally no wheezing or crackles


Heart regular rate and rhythm S1-S2, no rub or gallop


Abdomen is soft nontender nondistended positive bowel sounds no 

hepatosplenomegaly


Extremities no edema


Neuro alert and orientated to 3





Results


CBC & Chem 7: 


                                 03/18/19 08:36





                                 03/18/19 08:36


Labs: 


                  Abnormal Lab Results - Last 24 Hours (Table)











  03/17/19 03/17/19 03/17/19 Range/Units





  15:30 17:30 17:30 


 


WBC   16.8 H   (3.8-10.6)  k/uL


 


RBC     (3.80-5.40)  m/uL


 


Hgb   11.3 L   (11.4-16.0)  gm/dL


 


Hct     (34.0-46.0)  %


 


Neutrophils #   13.8 H   (1.3-7.7)  k/uL


 


Lymphocytes #     (1.0-4.8)  k/uL


 


Monocytes #   1.3 H   (0-1.0)  k/uL


 


BUN    23 H  (7-17)  mg/dL


 


Glucose    113 H  (74-99)  mg/dL


 


AST    50 H  (14-36)  U/L


 


Alkaline Phosphatase    142 H  ()  U/L


 


Total Protein     (6.3-8.2)  g/dL


 


Albumin     (3.5-5.0)  g/dL


 


Urine Appearance  Cloudy H    (Clear)  


 


Urine Protein  1+ H    (Negative)  


 


Urine Blood  Small H    (Negative)  


 


Ur Leukocyte Esterase  Large H    (Negative)  


 


Urine RBC  74 H    (0-5)  /hpf


 


Urine WBC  >182 H    (0-5)  /hpf


 


Urine WBC Clumps  Rare H    (None)  /hpf


 


Urine Mucus  Rare H    (None)  /hpf














  03/18/19 03/18/19 Range/Units





  08:36 08:36 


 


WBC    (3.8-10.6)  k/uL


 


RBC  3.66 L   (3.80-5.40)  m/uL


 


Hgb  10.2 L   (11.4-16.0)  gm/dL


 


Hct  32.7 L   (34.0-46.0)  %


 


Neutrophils #    (1.3-7.7)  k/uL


 


Lymphocytes #  0.7 L   (1.0-4.8)  k/uL


 


Monocytes #    (0-1.0)  k/uL


 


BUN    (7-17)  mg/dL


 


Glucose   145 H  (74-99)  mg/dL


 


AST   38 H  (14-36)  U/L


 


Alkaline Phosphatase    ()  U/L


 


Total Protein   6.0 L  (6.3-8.2)  g/dL


 


Albumin   3.4 L  (3.5-5.0)  g/dL


 


Urine Appearance    (Clear)  


 


Urine Protein    (Negative)  


 


Urine Blood    (Negative)  


 


Ur Leukocyte Esterase    (Negative)  


 


Urine RBC    (0-5)  /hpf


 


Urine WBC    (0-5)  /hpf


 


Urine WBC Clumps    (None)  /hpf


 


Urine Mucus    (None)  /hpf








                      Microbiology - Last 24 Hours (Table)











 03/17/19 15:30 Urine Culture - Preliminary





 Urine,Voided 














Thrombosis Risk Factor Assmnt





- Choose All That Apply


Any of the Below Risk Factors Present?: No


Other Risk Factors: Yes


Each Risk Factor Represents 3 Points: Age 75 years or older


Thrombosis Risk Factor Assessment Total Risk Factor Score: 3


Thrombosis Risk Factor Assessment Level: Moderate Risk





Assessment and Plan


Assessment: 





1.  Urinary tract infection.  Urine culture ordered.  started on Rocephin





2.  Constipation with fecal impaction.  KUB x-ray completed showing nonacute 

abdomen.  There appears to be multiple gallstones and clips from cholecystectomy

 correlation with the surgical history needed.  Patient started on Colace





3.  Urinary retention likely due to constipation.  Arciniega catheter is in place.  

Arciniega catheter to be removed this afternoon to its assess for retention.





4.  L1 wedge vertebral fracture chronic back pain.  Patient has TSLO brace at 

home.  Patient takes tramadol at home





5.  Underlying history of anxiety disorder





6.  Iron deficiency anemia patient maintained on ferrous sulfate





7.  History of cholecystectomy





8.  History of uterine cancer





9.  History of essential hypertension








DVT prophylaxis Lovenox.  GI prophylaxis Protonix








Time with Patient: Greater than 30 (Greater than 60% of the total time spent in 

counseling and coordination of care. I performed an examination of the patient 

and discussed their management with the Nurse Practitioner.  I have reviewed the

 Nurse Practitioner's notes and agree with the documented findings and plan of 

care)

## 2019-03-18 NOTE — P.GSCN
History of Present Illness


Consult date: 03/18/19


Reason for Consult: 





Urinary retention


Requesting physician: Sergio Almanza


History of present illness: 


The patient is an 89-year-old white female evaluated by Dr. Verma in 2017 for 

recurrent UTIs.  Cystoscopy at that time showed multiple tiny bladder calculi, 

with diffuse cystitis cystica.  The postvoid residual at that time was 206 mL.  

Examination at that time revealed a grade 3 cystocele, grade 3 rectocele.  She 

was referred to gynecology, and she was fitted for a pessary which failed to 

work.  She sustained an L1 wedge fracture and no February 2019, requiring 

analgesics.  She is currently admitted with constipation and urinary retention. 

A Arciniega catheter was placed in the emergency room, with return of approximately 

1 L of urine.  She has been disimpacted, and the Arciniega catheter has been 

removed.  Recent post void residuals have been approximately 400 mL.  





Review of Systems





- Constitutional


Denies chills, Denies fever





- Gastrointestinal


Reports constipation





- Genitourinary


Genitourinary: Reports difficulty voiding





- Musculoskeletal


Reports low back pain





Past Medical History


Past Medical History: Cancer, Hypertension


Additional Past Medical History / Comment(s): Scoliosis; Osteoporosis; Uterine 

cancer (1970s), lumbar fracture


History of Any Multi-Drug Resistant Organisms: None Reported


Past Surgical History: Cholecystectomy, Hysterectomy


Additional Past Surgical History / Comment(s): Lap Nissen


Past Anesthesia/Blood Transfusion Reactions: No Reported Reaction


Past Psychological History: Anxiety


Smoking Status: Current some day smoker


Past Alcohol Use History: None Reported


Past Drug Use History: None Reported





- Past Family History


  ** Mother


Family Medical History: Cancer





  ** Father


Family Medical History: Diabetes Mellitus





Medications and Allergies


                                Home Medications











 Medication  Instructions  Recorded  Confirmed  Type


 


Calcium Carbonate [Calcium] 600 mg PO DAILY 05/11/17 03/17/19 History


 


Cholecalciferol [Vitamin D3] 1,000 unit PO DAILY 05/11/17 03/17/19 History


 


Lisinopril [Zestril] 10 mg PO DAILY 05/11/17 03/17/19 History


 


traMADol HCL [Ultram] 50 mg PO QID 05/11/17 03/17/19 History


 


Multivitamins, Thera [Multivitamin 1 tab PO DAILY 01/25/19 03/17/19 History





(formulary)]    


 


Timolol 0.5% Ophth Soln [Timoptic 1 drop LEFT EYE DAILY 01/25/19 03/17/19 

History





0.5% Ophth Soln]    


 


Vit C/E/Zn/Coppr/Lutein/Zeaxan 1 cap PO BID 01/25/19 03/17/19 History





[Preservision Areds 2 Softgel]    


 


ALPRAZolam [Xanax] 0.25 mg PO BID PRN 02/09/19 03/17/19 History


 


Ferrous Sulfate [Iron (65  mg PO BID 30 Days #60 tab 02/11/19 03/17/19 Rx





Elemental)]    


 


Calcitonin Nasal [Fortical 1 spray EA NOSTRIL DAILY 03/17/19 03/17/19 History





(Miacalcin)]    


 


Desipramine [Norpramin] 25 mg PO HS 03/17/19 03/17/19 History


 


rOPINIRole HCL [Requip] 0.25 mg PO HS 03/17/19 03/17/19 History


 


oxyCODONE-APAP 7.5-325MG [Percocet 1 tab PO Q8HR PRN 03/18/19 03/18/19 History





7.5-325 mg]    








                                    Allergies











Allergy/AdvReac Type Severity Reaction Status Date / Time


 


aspirin AdvReac  Nausea & Verified 03/17/19 17:53





   Vomiting  














Surgical - Exam


                                   Vital Signs











Temp Pulse Resp BP Pulse Ox


 


 97.9 F   105 H  16   124/65   97 


 


 03/17/19 14:06  03/17/19 14:06  03/17/19 14:06  03/17/19 14:06  03/17/19 14:06














- General


well developed, well nourished, no distress





- Respiratory


normal respiratory effort





- Abdomen


Abdomen: soft, non tender, no guarding, no rigid, no rebound





- Psychiatric


oriented to time, oriented to person, oriented to place, speech is normal, 

memory intact





Results





- Labs





                                 03/18/19 08:36





                                 03/18/19 08:36


                  Abnormal Lab Results - Last 24 Hours (Table)











  03/17/19 03/17/19 03/18/19 Range/Units





  17:30 17:30 08:36 


 


WBC  16.8 H    (3.8-10.6)  k/uL


 


RBC    3.66 L  (3.80-5.40)  m/uL


 


Hgb  11.3 L   10.2 L  (11.4-16.0)  gm/dL


 


Hct    32.7 L  (34.0-46.0)  %


 


Neutrophils #  13.8 H    (1.3-7.7)  k/uL


 


Lymphocytes #    0.7 L  (1.0-4.8)  k/uL


 


Monocytes #  1.3 H    (0-1.0)  k/uL


 


BUN   23 H   (7-17)  mg/dL


 


Glucose   113 H   (74-99)  mg/dL


 


AST   50 H   (14-36)  U/L


 


Alkaline Phosphatase   142 H   ()  U/L


 


Total Protein     (6.3-8.2)  g/dL


 


Albumin     (3.5-5.0)  g/dL














  03/18/19 Range/Units





  08:36 


 


WBC   (3.8-10.6)  k/uL


 


RBC   (3.80-5.40)  m/uL


 


Hgb   (11.4-16.0)  gm/dL


 


Hct   (34.0-46.0)  %


 


Neutrophils #   (1.3-7.7)  k/uL


 


Lymphocytes #   (1.0-4.8)  k/uL


 


Monocytes #   (0-1.0)  k/uL


 


BUN   (7-17)  mg/dL


 


Glucose  145 H  (74-99)  mg/dL


 


AST  38 H  (14-36)  U/L


 


Alkaline Phosphatase   ()  U/L


 


Total Protein  6.0 L  (6.3-8.2)  g/dL


 


Albumin  3.4 L  (3.5-5.0)  g/dL








                      Microbiology - Last 24 Hours (Table)











 03/17/19 15:30 Urine Culture - Preliminary





 Urine,Voided 








                                 Diabetes panel











  03/17/19 03/18/19 Range/Units





  17:30 08:36 


 


Sodium  138  138  (137-145)  mmol/L


 


Potassium  4.7  4.5  (3.5-5.1)  mmol/L


 


Chloride  101  104  ()  mmol/L


 


Carbon Dioxide  27  28  (22-30)  mmol/L


 


BUN  23 H  14  (7-17)  mg/dL


 


Creatinine  0.73  0.60  (0.52-1.04)  mg/dL


 


Glucose  113 H  145 H  (74-99)  mg/dL


 


Calcium  10.2  9.2  (8.4-10.2)  mg/dL


 


AST  50 H  38 H  (14-36)  U/L


 


ALT  46  38  (9-52)  U/L


 


Alkaline Phosphatase  142 H  112  ()  U/L


 


Total Protein  6.6  6.0 L  (6.3-8.2)  g/dL


 


Albumin  3.8  3.4 L  (3.5-5.0)  g/dL








                                  Calcium panel











  03/17/19 03/18/19 Range/Units





  17:30 08:36 


 


Calcium  10.2  9.2  (8.4-10.2)  mg/dL


 


Albumin  3.8  3.4 L  (3.5-5.0)  g/dL








                                 Pituitary panel











  03/17/19 03/18/19 Range/Units





  17:30 08:36 


 


Sodium  138  138  (137-145)  mmol/L


 


Potassium  4.7  4.5  (3.5-5.1)  mmol/L


 


Chloride  101  104  ()  mmol/L


 


Carbon Dioxide  27  28  (22-30)  mmol/L


 


BUN  23 H  14  (7-17)  mg/dL


 


Creatinine  0.73  0.60  (0.52-1.04)  mg/dL


 


Glucose  113 H  145 H  (74-99)  mg/dL


 


Calcium  10.2  9.2  (8.4-10.2)  mg/dL








                                  Adrenal panel











  03/17/19 03/18/19 Range/Units





  17:30 08:36 


 


Sodium  138  138  (137-145)  mmol/L


 


Potassium  4.7  4.5  (3.5-5.1)  mmol/L


 


Chloride  101  104  ()  mmol/L


 


Carbon Dioxide  27  28  (22-30)  mmol/L


 


BUN  23 H  14  (7-17)  mg/dL


 


Creatinine  0.73  0.60  (0.52-1.04)  mg/dL


 


Glucose  113 H  145 H  (74-99)  mg/dL


 


Calcium  10.2  9.2  (8.4-10.2)  mg/dL


 


Total Bilirubin  0.4  0.3  (0.2-1.3)  mg/dL


 


AST  50 H  38 H  (14-36)  U/L


 


ALT  46  38  (9-52)  U/L


 


Alkaline Phosphatase  142 H  112  ()  U/L


 


Total Protein  6.6  6.0 L  (6.3-8.2)  g/dL


 


Albumin  3.8  3.4 L  (3.5-5.0)  g/dL














Assessment and Plan


(1) Urinary retention


Current Visit: Yes   Status: Acute   Code(s): R33.9 - RETENTION OF URINE, 

UNSPECIFIED   SNOMED Code(s): 200959018


   


Plan: 


The patient has a history of incomplete bladder emptying, likely due at least in

part to her known pelvic prolapse.  I suspect this has been exacerbated by her 

recent constipation.  She has been disimpacted but continues to empty her 

bladder incompletely.  I would suggest that her postvoid residuals continue to 

be monitored.  If it failed to improve, she should be discharged home with a 

Arciniega catheter and follow-up with Dr. Verma as an outpatient.  Please notify me 

if I can be of any further assistance.

## 2019-03-19 VITALS
TEMPERATURE: 97.4 F | RESPIRATION RATE: 16 BRPM | HEART RATE: 110 BPM | DIASTOLIC BLOOD PRESSURE: 57 MMHG | SYSTOLIC BLOOD PRESSURE: 133 MMHG

## 2019-03-19 LAB
ALBUMIN SERPL-MCNC: 3.3 G/DL (ref 3.5–5)
ALP SERPL-CCNC: 123 U/L (ref 38–126)
ALT SERPL-CCNC: 38 U/L (ref 9–52)
ANION GAP SERPL CALC-SCNC: 7 MMOL/L
AST SERPL-CCNC: 37 U/L (ref 14–36)
BASOPHILS # BLD AUTO: 0 K/UL (ref 0–0.2)
BASOPHILS NFR BLD AUTO: 0 %
BUN SERPL-SCNC: 14 MG/DL (ref 7–17)
CALCIUM SPEC-MCNC: 9.2 MG/DL (ref 8.4–10.2)
CHLORIDE SERPL-SCNC: 102 MMOL/L (ref 98–107)
CO2 SERPL-SCNC: 25 MMOL/L (ref 22–30)
EOSINOPHIL # BLD AUTO: 0.2 K/UL (ref 0–0.7)
EOSINOPHIL NFR BLD AUTO: 1 %
ERYTHROCYTE [DISTWIDTH] IN BLOOD BY AUTOMATED COUNT: 3.78 M/UL (ref 3.8–5.4)
ERYTHROCYTE [DISTWIDTH] IN BLOOD: 14.4 % (ref 11.5–15.5)
GLUCOSE SERPL-MCNC: 113 MG/DL (ref 74–99)
HCT VFR BLD AUTO: 33.6 % (ref 34–46)
HGB BLD-MCNC: 10.4 GM/DL (ref 11.4–16)
LYMPHOCYTES # SPEC AUTO: 0.9 K/UL (ref 1–4.8)
LYMPHOCYTES NFR SPEC AUTO: 8 %
MCH RBC QN AUTO: 27.7 PG (ref 25–35)
MCHC RBC AUTO-ENTMCNC: 31.1 G/DL (ref 31–37)
MCV RBC AUTO: 89 FL (ref 80–100)
MONOCYTES # BLD AUTO: 1 K/UL (ref 0–1)
MONOCYTES NFR BLD AUTO: 8 %
NEUTROPHILS # BLD AUTO: 9.8 K/UL (ref 1.3–7.7)
NEUTROPHILS NFR BLD AUTO: 82 %
PLATELET # BLD AUTO: 340 K/UL (ref 150–450)
POTASSIUM SERPL-SCNC: 4.2 MMOL/L (ref 3.5–5.1)
PROT SERPL-MCNC: 6.1 G/DL (ref 6.3–8.2)
SODIUM SERPL-SCNC: 134 MMOL/L (ref 137–145)
WBC # BLD AUTO: 12 K/UL (ref 3.8–10.6)

## 2019-03-19 RX ADMIN — LISINOPRIL SCH MG: 10 TABLET ORAL at 07:30

## 2019-03-19 RX ADMIN — CEFAZOLIN SCH MLS/HR: 330 INJECTION, POWDER, FOR SOLUTION INTRAMUSCULAR; INTRAVENOUS at 07:31

## 2019-03-19 RX ADMIN — Medication SCH UNIT: at 07:30

## 2019-03-19 RX ADMIN — CALCIUM CARBONATE (ANTACID) CHEW TAB 500 MG SCH MG: 500 CHEW TAB at 07:36

## 2019-03-19 RX ADMIN — CALCITONIN SALMON SCH SPRAY: 200 SPRAY, METERED NASAL at 07:31

## 2019-03-19 RX ADMIN — Medication SCH MG: at 07:30

## 2019-03-19 RX ADMIN — OXYCODONE HYDROCHLORIDE AND ACETAMINOPHEN PRN EACH: 7.5; 325 TABLET ORAL at 01:23

## 2019-03-19 RX ADMIN — TIMOLOL MALEATE SCH DROPS: 5 SOLUTION OPHTHALMIC at 07:31

## 2019-03-19 RX ADMIN — THERA TABS SCH EACH: TAB at 07:30

## 2019-03-19 RX ADMIN — DOCUSATE SODIUM SCH MG: 100 CAPSULE, LIQUID FILLED ORAL at 07:30

## 2019-03-19 NOTE — P.DS
Providers


Date of admission: 


03/19/19 08:52





Expected date of discharge: 03/19/19


Attending physician: 


Sergio Almanza





Consults: 





                                        





03/18/19 13:25


Consult Physician Routine 


   Consulting Provider: Tushar Ivey


   Consult Reason/Comments: Urinary retention


   Do you want consulting provider notified?: Yes











Primary care physician: 


Shazia Dennison





[facility] Course: 





Discharge diagnosis





1.  Urinary tract infection.  started on Rocephin.  Blood culture showing no 

growth.  Patient will be DC'd on Ceftin for 5 more days





2.  Constipation with fecal impaction.  KUB x-ray completed showing nonacute 

abdomen.  There appears to be multiple gallstones and clips from cholecystectomy

correlation with the surgical history needed.  Patient started on Colace





3.  Urinary retention related to constipation and pelvic prolapse.  Arciniega 

catheter is in place.  Arciniega catheter to be removed this afternoon to its assess

for retention.  Abdominal ultrasound completed showing gallstones related to 

numerous obstructing right renal pelvic calculi catheter together.  This creates

severe right hydronephrosis.  No current cortical renal thinning is seen 

although the severe hydronephrosis was seen on prior exam 06/20/2017.  Nursing 

staff discussed place findings with Dr. ivey per Urology services, no further 

workup in regards to these findings.  Patient did have Arciniega catheter reinserted

last night due to increased residuals.  Per urology service is patient to be 

discharged with Arciniega catheter and follow-up with Dr. Verma as outpatient in one

week





4.  L1 wedge vertebral fracture chronic back pain.  Patient has TSLO brace at 

home. 





5.  Underlying history of anxiety disorder





6.  Iron deficiency anemia patient maintained on ferrous sulfate





7.  History of cholecystectomy





8.  History of uterine cancer





9.  History of essential hypertension





10.  Tachycardia with hypertension.  EKG completed showing sinus tachycardia 

with a heart rate of 101.  Patient's blood pressure elevated throughout night.  

Will start patient on Lopressor 25 mg twice a day and patient to follow-up with 

her PCP.








Hospital course





This is a 89-year-old female patient of Dr. Dennison.  Patient presented to the 

emergency room with complaints of constipation, urinary retention and weakness. 

Patient was recently admitted on 02/10/2019 in which she had of L1 wedged 

vertebral fracture.  At that time patient was discharged home with TLSO brace 

and prednisone taper.  Per patient's family patient has been doing well at home.

 Patient's symptoms started 2 days ago.  Prior to admission patient had not had 

bowel movement for 6 days.  Patient was disimpacted in the ER.  Patient reports 

significant relief.  Abdominal x-ray completed showing nonacute abdomen.  There 

appears to be multiple gallstones and clips from cholecystectomy.  Correlation 

with surgical history is needed.  Patient has a past medical history of uterine 

cancer, hypertension, scoliosis, osteoporosis, cholecystectomy and anxiety.  

Patient was found to have urinary tract infection.  Initial white blood cell 

elevated at 16.8.  Patient started on Rocephin for IV antibiotics urine culture 

ordered.  Arciniega catheter is in place.  Colace added.  At this time patient 

denies chest pain or shortness of breath.  Patient denies nausea vomiting or 

diarrhea.  Patient denies any urinary burning or frequency








On 03/19/2019 patient is currently resting comfortably in bed.  Patient is very 

eager to go home.  Patient expresses she does not like to be hospital.  Patient 

did have Arciniega catheter placed last night due to increased residuals.  Patient 

was seen by urology services.  Patient to be discharged home with Arciniega catheter

in place and follow-up outpatient with Dr. Verma.  Per nursing staff findings of

abdominal ultrasound were discussed with urology services no further workup at 

this time.  EKG also completed showing sinus tachycardia.  Patient was started 

on Lopressor 25 mg twice a day.  Patient to follow-up closely with PCP and 

urology services.  Patient will be discharged on Ceftin for 5 more days for 

urinary tract infection.  Patient also started on Colace for constipation.  At 

this time patient denies chest pain or shortness breath.  Patient denies nausea 

vomiting or diarrhea.  Patient denies any urinary burning or frequency.





I performed an examination of the patient and discussed their management with 

the Nurse Practitioner.  I have reviewed the Nurse Practitioner's notes and 

agree with the documented findings and plan of care


Patient Condition at Discharge: Stable





Plan - Discharge Summary


New Discharge Prescriptions: 


New


   Cefuroxime Axetil [Ceftin] 500 mg PO BID 5 Days #10 tab


   Docusate [Colace] 100 mg PO BID 60 Days #30 cap


   Tamsulosin [Flomax] 0.4 mg PO PC-SUPPER #30 cap.er.24h


   Metoprolol Tartrate [Lopressor] 25 mg PO BID 30 Days #60 tablet





Continue


   traMADol HCL [Ultram] 50 mg PO QID


   Cholecalciferol [Vitamin D3] 1,000 unit PO DAILY


   Lisinopril [Zestril] 10 mg PO DAILY


   Calcium Carbonate [Calcium] 600 mg PO DAILY


   Vit C/E/Zn/Coppr/Lutein/Zeaxan [Preservision Areds 2 Softgel] 1 cap PO BID


   Timolol 0.5% Ophth Soln [Timoptic 0.5% Ophth Soln] 1 drop LEFT EYE DAILY


   Multivitamins, Thera [Multivitamin (formulary)] 1 tab PO DAILY


   ALPRAZolam [Xanax] 0.25 mg PO BID PRN


     PRN Reason: Anxiety


   Ferrous Sulfate [Iron (65 MG Elemental)] 325 mg PO BID 30 Days #60 tab


   rOPINIRole HCL [Requip] 0.25 mg PO HS


   Desipramine [Norpramin] 25 mg PO HS


   Calcitonin Nasal [Fortical (Miacalcin)] 1 spray EA NOSTRIL DAILY


   oxyCODONE-APAP 7.5-325MG [Percocet 7.5-325 mg] 1 tab PO Q8HR PRN


     PRN Reason: Pain


Discharge Medication List





Calcium Carbonate [Calcium] 600 mg PO DAILY 05/11/17 [History]


Cholecalciferol [Vitamin D3] 1,000 unit PO DAILY 05/11/17 [History]


Lisinopril [Zestril] 10 mg PO DAILY 05/11/17 [History]


traMADol HCL [Ultram] 50 mg PO QID 05/11/17 [History]


Multivitamins, Thera [Multivitamin (formulary)] 1 tab PO DAILY 01/25/19 

[History]


Timolol 0.5% Ophth Soln [Timoptic 0.5% Ophth Soln] 1 drop LEFT EYE DAILY 01/ 25/19 [History]


Vit C/E/Zn/Coppr/Lutein/Zeaxan [Preservision Areds 2 Softgel] 1 cap PO BID 

01/25/19 [History]


ALPRAZolam [Xanax] 0.25 mg PO BID PRN 02/09/19 [History]


Ferrous Sulfate [Iron (65 MG Elemental)] 325 mg PO BID 30 Days #60 tab 02/11/19 

[Rx]


Calcitonin Nasal [Fortical (Miacalcin)] 1 spray EA NOSTRIL DAILY 03/17/19 

[History]


Desipramine [Norpramin] 25 mg PO HS 03/17/19 [History]


rOPINIRole HCL [Requip] 0.25 mg PO HS 03/17/19 [History]


oxyCODONE-APAP 7.5-325MG [Percocet 7.5-325 mg] 1 tab PO Q8HR PRN 03/18/19 

[History]


Cefuroxime Axetil [Ceftin] 500 mg PO BID 5 Days #10 tab 03/19/19 [Rx]


Docusate [Colace] 100 mg PO BID 60 Days #30 cap 03/19/19 [Rx]


Metoprolol Tartrate [Lopressor] 25 mg PO BID 30 Days #60 tablet 03/19/19 [Rx]


Tamsulosin [Flomax] 0.4 mg PO PC-SUPPER #30 cap.er.24h 03/19/19 [Rx]








Follow up Appointment(s)/Referral(s): 


Shazia Dennison DO [Primary Care Provider] - 1-2 days


Mark Verma MD [STAFF PHYSICIAN] - 1 Week


Activity/Diet/Wound Care/Special Instructions: 


[facility] [phone]





Patient will be discharged home with indwelling Arciniega catheter due to urinary 

retention.  Patient will have home healthcare services.  Patient to follow-up 

with urology services in 1 week.











Activity as tolerated.  Patient has TSLO brace at home





Diet regular


Discharge Disposition: HOME WITH HOME HEALTH SERVICES

## 2022-12-13 NOTE — US
EXAMINATION TYPE: US abdomen complete

 

DATE OF EXAM: 3/19/2019

 

COMPARISON: CT & US

 

CLINICAL HISTORY: possible gallstones. Patient originally stated only surgical history of hysterectom
y. 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

 

EXAM MEASUREMENTS:

 

Liver Length:  15.7 cm   

Gallbladder Wall:  Surgically absent cm   

CBD:  1.0 cm

Spleen:  9.4 cm   

Right Kidney:  11.9 x 6.5 x 6.7 cm 

Left Kidney:  9.1 x 3.5 x 3.3 cm   

 

Shadowing in the gallbladder fossa likely represents bowel rather than cholelithiasis although could 
be confirmed with CT. 

 

Pancreas:  not visualized due to midline bowel gas

Liver:  wnl  

Gallbladder:  Surgically absent

CBD:  Upper limits of normal for surgical absence of the gallbladder. 

Spleen:  wnl   

Right Kidney:  severe hydronephrosis with multiple stones in renal pelvis.    

Left Kidney:  wnl   

Upper IVC:  wnl  

Abd Aorta:  bifurcation not visualized

 

 

 

The liver is homogenous.  The intrahepatic portion of the IVC and proximal abdominal aorta are within
 normal limits.    Common bile duct is unremarkable.    The spleen is unremarkable.  Kidneys are symm
etric and free of hydronephrosis.  No renal lesions are seen.

 

IMPRESSION: 

1. The previously questioned gallstones relate to numerous obstructing right renal pelvic calculi gat
hered together. This creates severe right hydronephrosis. No current cortical renal thinning is seen 
although the severe hydronephrosis was seen on the prior of 6/20/2017.

2. Gallbladder surgically absent and common bile duct is upper limits of normal size.

3. Nonvisualization of the increased due to overlying bowel gas. Abdominal aorta is also suboptimally
 visualized. · Home regimen: losartan 100 mg daily and HCTZ 25 mg daily   · Normotensive thus far   Hold medications under stroke protocol